# Patient Record
Sex: MALE | Race: WHITE | NOT HISPANIC OR LATINO | Employment: OTHER | URBAN - METROPOLITAN AREA
[De-identification: names, ages, dates, MRNs, and addresses within clinical notes are randomized per-mention and may not be internally consistent; named-entity substitution may affect disease eponyms.]

---

## 2021-06-10 ENCOUNTER — OFFICE VISIT (OUTPATIENT)
Dept: INTERNAL MEDICINE CLINIC | Facility: CLINIC | Age: 61
End: 2021-06-10
Payer: COMMERCIAL

## 2021-06-10 VITALS
HEART RATE: 85 BPM | WEIGHT: 226 LBS | SYSTOLIC BLOOD PRESSURE: 124 MMHG | DIASTOLIC BLOOD PRESSURE: 80 MMHG | BODY MASS INDEX: 29.95 KG/M2 | TEMPERATURE: 98.1 F | OXYGEN SATURATION: 97 % | HEIGHT: 73 IN

## 2021-06-10 DIAGNOSIS — E11.42 TYPE 2 DIABETES MELLITUS WITH DIABETIC POLYNEUROPATHY, WITHOUT LONG-TERM CURRENT USE OF INSULIN (HCC): Primary | ICD-10-CM

## 2021-06-10 DIAGNOSIS — Z13.6 ENCOUNTER FOR SCREENING FOR CARDIOVASCULAR DISORDERS: ICD-10-CM

## 2021-06-10 DIAGNOSIS — M51.35 DDD (DEGENERATIVE DISC DISEASE), THORACOLUMBAR: ICD-10-CM

## 2021-06-10 DIAGNOSIS — R06.00 DOE (DYSPNEA ON EXERTION): ICD-10-CM

## 2021-06-10 DIAGNOSIS — Z80.42 FAMILY HISTORY OF PROSTATE CANCER IN FATHER: ICD-10-CM

## 2021-06-10 DIAGNOSIS — G89.29 OTHER CHRONIC PAIN: ICD-10-CM

## 2021-06-10 DIAGNOSIS — Z12.11 ENCOUNTER FOR SCREENING FOR MALIGNANT NEOPLASM OF COLON: ICD-10-CM

## 2021-06-10 DIAGNOSIS — I73.9 CLAUDICATION OF BOTH LOWER EXTREMITIES (HCC): ICD-10-CM

## 2021-06-10 DIAGNOSIS — E55.9 VITAMIN D DEFICIENCY: ICD-10-CM

## 2021-06-10 DIAGNOSIS — E78.5 HYPERLIPIDEMIA, UNSPECIFIED HYPERLIPIDEMIA TYPE: ICD-10-CM

## 2021-06-10 DIAGNOSIS — Z00.00 MEDICARE ANNUAL WELLNESS VISIT, SUBSEQUENT: ICD-10-CM

## 2021-06-10 PROCEDURE — 3725F SCREEN DEPRESSION PERFORMED: CPT | Performed by: INTERNAL MEDICINE

## 2021-06-10 PROCEDURE — 1036F TOBACCO NON-USER: CPT | Performed by: INTERNAL MEDICINE

## 2021-06-10 PROCEDURE — 99214 OFFICE O/P EST MOD 30 MIN: CPT | Performed by: INTERNAL MEDICINE

## 2021-06-10 PROCEDURE — 3008F BODY MASS INDEX DOCD: CPT | Performed by: INTERNAL MEDICINE

## 2021-06-10 RX ORDER — OXYCODONE HYDROCHLORIDE 15 MG/1
TABLET ORAL
COMMUNITY
Start: 2021-05-12

## 2021-06-10 RX ORDER — OMEPRAZOLE 20 MG/1
CAPSULE, DELAYED RELEASE ORAL DAILY
COMMUNITY

## 2021-06-10 RX ORDER — ALPRAZOLAM 3 MG/1
3 TABLET, EXTENDED RELEASE ORAL
COMMUNITY
End: 2022-02-10

## 2021-06-10 NOTE — PROGRESS NOTES
Assessment/Plan:    Patient is here to establish care and has been lost to f/u for past 5 years  PMH for uncontrolled type 2 DM; a 1 c 10 4  start metformin 500 mg BID and take with meals  Retest a 1c in 3 months  Check micro albumin cr ratio  Need to see Optho for retinal exam  Start checking fasting blood sugar  Obtain lipid panel  H/o chronic pain r/t DDD of thoracolumbar spine including cervical spine; h/o fall off a ladder  He follows with neurology and is on oxycodone  H/o opioid use for over > 30 years  Also on xanax by neurology  C/o intermittent claudication to b/l LE at rest and FELDMAN; check DAMON with exercise study  Stress test ordered  Call Dr Lynda Hidalgo for [facility] screening  Labs ordered today including cbc, cmp, lipid, and vitamin d  F/u in 3-4 months  BMI Counseling: Body mass index is 29 82 kg/m²  The BMI is above normal  Nutrition recommendations include decreasing portion sizes, encouraging healthy choices of fruits and vegetables, decreasing fast food intake and consuming healthier snacks  Exercise recommendations include moderate physical activity 150 minutes/week  Depression Screening and Follow-up Plan: Patient's depression screening was positive with a PHQ-2 score of 0  Clincally patient does not have depression  No treatment is required  No problem-specific Assessment & Plan notes found for this encounter  Diagnoses and all orders for this visit:    Type 2 diabetes mellitus with diabetic polyneuropathy, without long-term current use of insulin (HCC)  -     CBC and differential; Future  -     Comprehensive metabolic panel; Future  -     metFORMIN (GLUCOPHAGE) 500 mg tablet;  Take 1 tablet (500 mg total) by mouth 2 (two) times a day with meals  -     Microalbumin / creatinine urine ratio    Hyperlipidemia, unspecified hyperlipidemia type    DDD (degenerative disc disease), thoracolumbar    Other chronic pain    Encounter for screening for cardiovascular disorders  - Lipid panel; Future    Vitamin D deficiency  -     Vitamin D 25 hydroxy; Future    Claudication of both lower extremities (HCC)  -     VAS DAMON with exercise study; Future    FELDMAN (dyspnea on exertion)  -     Stress test only, exercise; Future    Encounter for screening for malignant neoplasm of colon  -     Ambulatory referral to Gastroenterology; Future    Family history of prostate cancer in father  -     PSA, total and free; Future    Other orders  -     oxyCODONE (ROXICODONE) 15 mg immediate release tablet; take 1 tablet by mouth every 6 hours if needed for CHRONIC NECK PAIN  -     ALPRAZolam ER (XANAX XR) 3 MG 24 hr tablet; Take 3 mg by mouth  -     omeprazole (PriLOSEC) 20 mg delayed release capsule; Take by mouth daily  -     Calcium-Vitamin D-Vitamin K (CALCIUM + D + K PO); Take 2,400 mg by mouth  -     VITAMIN D PO; Take by mouth  -     Boswellia-Glucosamine-Vit D (OSTEO BI-FLEX ONE PER DAY PO); Take by mouth          Subjective:      Patient ID: Charmaine Peng is a 61 y o  male  Patient is here to establish care and has been lost to f/u for past 5 years  PMH for uncontrolled type 2 DM and H/o chronic pain r/t DDD of thoracolumbar spine including cervical spine; h/o fall off a ladder  Has been on opioids for many years  Taking stool softener for constipation  The following portions of the patient's history were reviewed and updated as appropriate:   He  has a past medical history of Diabetes ([address] ), Hyperlipidemia, and Scoliosis  He There are no active problems to display for this patient  He  has no past surgical history on file  His family history includes Diabetes in his father; Heart disease in his father; Hyperlipidemia in his mother; Hypertension in his mother; Prostate cancer in his father; Stroke in his father  He  reports that he has never smoked  He has never used smokeless tobacco  He reports current alcohol use  He reports that he does not use drugs    Current Outpatient Medications   Medication Sig Dispense Refill    ALPRAZolam ER (XANAX XR) 3 MG 24 hr tablet Take 3 mg by mouth      Boswellia-Glucosamine-Vit D (OSTEO BI-FLEX ONE PER DAY PO) Take by mouth      Calcium-Vitamin D-Vitamin K (CALCIUM + D + K PO) Take 2,400 mg by mouth      omeprazole (PriLOSEC) 20 mg delayed release capsule Take by mouth daily      oxyCODONE (ROXICODONE) 15 mg immediate release tablet take 1 tablet by mouth every 6 hours if needed for CHRONIC NECK PAIN      VITAMIN D PO Take by mouth      metFORMIN (GLUCOPHAGE) 500 mg tablet Take 1 tablet (500 mg total) by mouth 2 (two) times a day with meals 60 tablet 5     No current facility-administered medications for this visit  Current Outpatient Medications on File Prior to Visit   Medication Sig    ALPRAZolam ER (XANAX XR) 3 MG 24 hr tablet Take 3 mg by mouth    Boswellia-Glucosamine-Vit D (OSTEO BI-FLEX ONE PER DAY PO) Take by mouth    Calcium-Vitamin D-Vitamin K (CALCIUM + D + K PO) Take 2,400 mg by mouth    omeprazole (PriLOSEC) 20 mg delayed release capsule Take by mouth daily    oxyCODONE (ROXICODONE) 15 mg immediate release tablet take 1 tablet by mouth every 6 hours if needed for CHRONIC NECK PAIN    VITAMIN D PO Take by mouth     No current facility-administered medications on file prior to visit  He is allergic to penicillins and prednisone       Review of Systems   Constitutional: Negative for appetite change, chills, fatigue and unexpected weight change  HENT: Negative for congestion and dental problem  Respiratory: Negative for cough, shortness of breath and wheezing  Cardiovascular: Negative for chest pain, palpitations and leg swelling  Gastrointestinal: Negative for abdominal pain, blood in stool, diarrhea, nausea and vomiting  Endocrine: Positive for polyphagia and polyuria  Musculoskeletal: Positive for arthralgias, back pain, neck pain and neck stiffness  Neurological: Positive for numbness   Negative for dizziness, tremors, seizures, speech difficulty, weakness, light-headedness and headaches  All other systems reviewed and are negative  Objective:      /80 (BP Location: Left arm, Patient Position: Sitting, Cuff Size: Adult)   Pulse 85   Temp 98 1 °F (36 7 °C) (Temporal)   Ht 6' 1" (1 854 m)   Wt 103 kg (226 lb)   SpO2 97%   BMI 29 82 kg/m²          Physical Exam  Vitals signs and nursing note reviewed  Constitutional:       Appearance: Normal appearance  He is overweight  HENT:      Head: Normocephalic and atraumatic  Mouth/Throat:      Mouth: Mucous membranes are moist    Neck:      Musculoskeletal: Normal range of motion  Vascular: No carotid bruit  Cardiovascular:      Rate and Rhythm: Normal rate and regular rhythm  Heart sounds: Normal heart sounds  No murmur  Pulmonary:      Effort: Pulmonary effort is normal       Breath sounds: Normal breath sounds  No wheezing, rhonchi or rales  Abdominal:      General: Bowel sounds are normal       Palpations: Abdomen is soft  Musculoskeletal: Normal range of motion  Right lower leg: No edema  Lymphadenopathy:      Cervical: No cervical adenopathy  Neurological:      Mental Status: He is alert

## 2021-10-12 ENCOUNTER — OFFICE VISIT (OUTPATIENT)
Dept: INTERNAL MEDICINE CLINIC | Facility: CLINIC | Age: 61
End: 2021-10-12
Payer: COMMERCIAL

## 2021-10-12 VITALS
BODY MASS INDEX: 30.09 KG/M2 | TEMPERATURE: 98.5 F | WEIGHT: 227 LBS | OXYGEN SATURATION: 96 % | SYSTOLIC BLOOD PRESSURE: 130 MMHG | HEART RATE: 95 BPM | RESPIRATION RATE: 20 BRPM | DIASTOLIC BLOOD PRESSURE: 88 MMHG | HEIGHT: 73 IN

## 2021-10-12 DIAGNOSIS — Z80.42 FAMILY HISTORY OF PROSTATE CANCER IN FATHER: ICD-10-CM

## 2021-10-12 DIAGNOSIS — Z87.81 HISTORY OF BROKEN NOSE: ICD-10-CM

## 2021-10-12 DIAGNOSIS — Z11.4 ENCOUNTER FOR SCREENING FOR HIV: ICD-10-CM

## 2021-10-12 DIAGNOSIS — Z23 NEED FOR PNEUMOCOCCAL VACCINATION: ICD-10-CM

## 2021-10-12 DIAGNOSIS — E11.42 TYPE 2 DIABETES MELLITUS WITH DIABETIC POLYNEUROPATHY, WITHOUT LONG-TERM CURRENT USE OF INSULIN (HCC): Primary | ICD-10-CM

## 2021-10-12 DIAGNOSIS — E78.5 HYPERLIPIDEMIA, UNSPECIFIED HYPERLIPIDEMIA TYPE: ICD-10-CM

## 2021-10-12 DIAGNOSIS — M51.35 DDD (DEGENERATIVE DISC DISEASE), THORACOLUMBAR: ICD-10-CM

## 2021-10-12 DIAGNOSIS — K22.0 ACHALASIA OF ESOPHAGUS: ICD-10-CM

## 2021-10-12 DIAGNOSIS — Z11.59 ENCOUNTER FOR HEPATITIS C SCREENING TEST FOR LOW RISK PATIENT: ICD-10-CM

## 2021-10-12 DIAGNOSIS — R04.2 BLOODY SPUTUM: ICD-10-CM

## 2021-10-12 DIAGNOSIS — Z12.11 ENCOUNTER FOR SCREENING FOR MALIGNANT NEOPLASM OF COLON: ICD-10-CM

## 2021-10-12 LAB — SL AMB POCT HEMOGLOBIN AIC: 9.8 (ref ?–6.5)

## 2021-10-12 PROCEDURE — 3046F HEMOGLOBIN A1C LEVEL >9.0%: CPT | Performed by: INTERNAL MEDICINE

## 2021-10-12 PROCEDURE — 83036 HEMOGLOBIN GLYCOSYLATED A1C: CPT | Performed by: INTERNAL MEDICINE

## 2021-10-12 PROCEDURE — 1036F TOBACCO NON-USER: CPT | Performed by: INTERNAL MEDICINE

## 2021-10-12 PROCEDURE — 3008F BODY MASS INDEX DOCD: CPT | Performed by: INTERNAL MEDICINE

## 2021-10-12 PROCEDURE — 90471 IMMUNIZATION ADMIN: CPT | Performed by: INTERNAL MEDICINE

## 2021-10-12 PROCEDURE — 90732 PPSV23 VACC 2 YRS+ SUBQ/IM: CPT | Performed by: INTERNAL MEDICINE

## 2021-10-12 PROCEDURE — 99214 OFFICE O/P EST MOD 30 MIN: CPT | Performed by: INTERNAL MEDICINE

## 2021-10-12 RX ORDER — PIOGLITAZONEHYDROCHLORIDE 15 MG/1
15 TABLET ORAL DAILY
Qty: 90 TABLET | Refills: 5 | Status: SHIPPED | OUTPATIENT
Start: 2021-10-12 | End: 2022-07-29 | Stop reason: ALTCHOICE

## 2021-10-20 ENCOUNTER — TELEPHONE (OUTPATIENT)
Dept: INTERNAL MEDICINE CLINIC | Facility: CLINIC | Age: 61
End: 2021-10-20

## 2021-11-09 ENCOUNTER — TELEPHONE (OUTPATIENT)
Dept: INTERNAL MEDICINE CLINIC | Facility: CLINIC | Age: 61
End: 2021-11-09

## 2021-11-22 ENCOUNTER — TELEPHONE (OUTPATIENT)
Dept: GASTROENTEROLOGY | Facility: CLINIC | Age: 61
End: 2021-11-22

## 2021-11-22 ENCOUNTER — TELEPHONE (OUTPATIENT)
Dept: PULMONOLOGY | Facility: CLINIC | Age: 61
End: 2021-11-22

## 2021-11-30 ENCOUNTER — CONSULT (OUTPATIENT)
Dept: PULMONOLOGY | Facility: CLINIC | Age: 61
End: 2021-11-30
Payer: COMMERCIAL

## 2021-11-30 VITALS
BODY MASS INDEX: 29.42 KG/M2 | DIASTOLIC BLOOD PRESSURE: 84 MMHG | HEIGHT: 73 IN | WEIGHT: 222 LBS | OXYGEN SATURATION: 97 % | HEART RATE: 85 BPM | SYSTOLIC BLOOD PRESSURE: 124 MMHG | TEMPERATURE: 97.9 F

## 2021-11-30 DIAGNOSIS — R05.3 CHRONIC COUGH: Primary | ICD-10-CM

## 2021-11-30 DIAGNOSIS — R04.2 BLOODY SPUTUM: ICD-10-CM

## 2021-11-30 DIAGNOSIS — R09.82 POSTNASAL DRIP: ICD-10-CM

## 2021-11-30 PROCEDURE — 3008F BODY MASS INDEX DOCD: CPT | Performed by: INTERNAL MEDICINE

## 2021-11-30 PROCEDURE — 99204 OFFICE O/P NEW MOD 45 MIN: CPT | Performed by: INTERNAL MEDICINE

## 2022-01-05 RX ORDER — ALPRAZOLAM 1 MG/1
TABLET ORAL
COMMUNITY
Start: 2021-11-08

## 2022-01-06 ENCOUNTER — OFFICE VISIT (OUTPATIENT)
Dept: GASTROENTEROLOGY | Facility: CLINIC | Age: 62
End: 2022-01-06
Payer: COMMERCIAL

## 2022-01-06 VITALS
HEART RATE: 85 BPM | WEIGHT: 222 LBS | BODY MASS INDEX: 29.42 KG/M2 | SYSTOLIC BLOOD PRESSURE: 130 MMHG | DIASTOLIC BLOOD PRESSURE: 84 MMHG | HEIGHT: 73 IN

## 2022-01-06 DIAGNOSIS — R13.19 ESOPHAGEAL DYSPHAGIA: ICD-10-CM

## 2022-01-06 DIAGNOSIS — Z86.010 HISTORY OF COLON POLYPS: Primary | ICD-10-CM

## 2022-01-06 PROCEDURE — 3079F DIAST BP 80-89 MM HG: CPT | Performed by: INTERNAL MEDICINE

## 2022-01-06 PROCEDURE — 1036F TOBACCO NON-USER: CPT | Performed by: INTERNAL MEDICINE

## 2022-01-06 PROCEDURE — 99244 OFF/OP CNSLTJ NEW/EST MOD 40: CPT | Performed by: INTERNAL MEDICINE

## 2022-01-06 PROCEDURE — 3008F BODY MASS INDEX DOCD: CPT | Performed by: INTERNAL MEDICINE

## 2022-01-06 PROCEDURE — 3075F SYST BP GE 130 - 139MM HG: CPT | Performed by: INTERNAL MEDICINE

## 2022-01-06 RX ORDER — OXYCODONE HYDROCHLORIDE 5 MG/1
5 TABLET ORAL EVERY 4 HOURS PRN
COMMUNITY
Start: 2021-12-08

## 2022-01-06 NOTE — PATIENT INSTRUCTIONS
Scheduled date of EGD/colonoscopy (as of today):2/10/22  Physician performing EGD/colonoscopy:Braxton  Location of EGD/colonoscopy:[facility] bowel prep reviewed with patient:miralax/dulcolax  Instructions reviewed with patient by:Leslie CANALES  Clearances:  none

## 2022-01-06 NOTE — PROGRESS NOTES
[facility] - Outpatient Consultation  Ioana Waldron 64 y o  male MRN: [id number]  Encounter: [id number]          ASSESSMENT AND PLAN:      1  History of colon polyps  - Colonoscopy; Future    2  Esophageal dysphagia  - EGD; Future    ______________________________________________________________________    HPI:  This 51-year-old male comes the office today with a primary complaint of coughing up blood  When I asked him why he was seeing a gastroenterologist for coughing up blood, his comment was that the episodes always occur in association with eating  He states that the food feels like it is getting stuck in his throat area or in his esophagus and then he will start to cough and eventually the obstruction will come up and sometimes there is blood with it  He has not been seen by ear nose throat specialist at this time  He does have occasional episodes of abdominal pain in the epigastrium  He states that when the food gets stuck sometimes drinking water helps to pass the food as well  He denies any current heartburn symptoms because he is taking omeprazole 20 mg daily  He does have problems with constipation but is also on oxycodone on a regular basis  He denies any diarrhea  There is no family history for colon cancer or for colon polyp  There is no family history for esophageal cancer  His last colonoscopy was 7 years ago and he does have a history of colon polyps  REVIEW OF SYSTEMS:    CONSTITUTIONAL: Denies any fever, chills, rigors, and weight loss  HEENT: No earache or tinnitus  Denies hearing loss or visual disturbances  CARDIOVASCULAR: No chest pain or palpitations  RESPIRATORY: Denies any cough, hemoptysis, shortness of breath or dyspnea on exertion  GASTROINTESTINAL: As noted in the History of Present Illness  GENITOURINARY: No problems with urination  Denies any hematuria or dysuria  NEUROLOGIC: No dizziness or vertigo, denies headaches  MUSCULOSKELETAL: Denies any muscle or joint pain  SKIN: Denies skin rashes or itching  ENDOCRINE: Denies excessive thirst  Denies intolerance to heat or cold  PSYCHOSOCIAL: Denies depression or anxiety  Denies any recent memory loss  Historical Information   Past Medical History:   Diagnosis Date    Cough     Diabetes ([address] )     Hyperlipidemia     Scoliosis      Past Surgical History:   Procedure Laterality Date    TOOTH EXTRACTION  06/2021     Social History   Social History     Substance and Sexual Activity   Alcohol Use Yes    Comment: rarely     Social History     Substance and Sexual Activity   Drug Use Never     Social History     Tobacco Use   Smoking Status Never Smoker   Smokeless Tobacco Never Used     Family History   Problem Relation Age of Onset    Hypertension Mother     Hyperlipidemia Mother     Prostate cancer Father     Heart disease Father     Stroke Father     Diabetes Father        Meds/Allergies       Current Outpatient Medications:     ALPRAZolam (XANAX) 1 mg tablet    Boswellia-Glucosamine-Vit D (OSTEO BI-FLEX ONE PER DAY PO)    Calcium-Vitamin D-Vitamin K (CALCIUM + D + K PO)    metFORMIN (GLUCOPHAGE) 500 mg tablet    omeprazole (PriLOSEC) 20 mg delayed release capsule    oxyCODONE (ROXICODONE) 15 mg immediate release tablet    oxyCODONE (ROXICODONE) 5 immediate release tablet    pioglitazone (ACTOS) 15 mg tablet    VITAMIN D PO    ALPRAZolam ER (XANAX XR) 3 MG 24 hr tablet    Allergies   Allergen Reactions    Penicillins Hives     rash and hives      Prednisone Dizziness     myalgia             Objective     Blood pressure 130/84, pulse 85, height 6' 1" (1 854 m), weight 101 kg (222 lb)  Body mass index is 29 29 kg/m²          PHYSICAL EXAM:      General Appearance:   Alert, cooperative, no distress   HEENT:   Normocephalic, atraumatic, anicteric      Neck:  Supple, symmetrical, trachea midline   Lungs:   Clear to auscultation bilaterally; no rales, rhonchi or wheezing; respirations unlabored    Heart[de-identified]   Regular rate and rhythm; no murmur, rub, or gallop  Abdomen:   Soft, non-tender, non-distended; normal bowel sounds; no masses, no organomegaly    Genitalia:   Deferred    Rectal:   Deferred    Extremities:  No cyanosis, clubbing or edema    Pulses:  2+ and symmetric    Skin:  No jaundice, rashes, or lesions    Lymph nodes:  No palpable cervical lymphadenopathy        Lab Results:   No visits with results within 1 Day(s) from this visit  Latest known visit with results is:   Office Visit on 10/12/2021   Component Date Value    Hemoglobin A1C 10/12/2021 9 8*         Radiology Results:   No results found  Answers for HPI/ROS submitted by the patient on 1/5/2022  arthralgias: Yes  belching: Yes  constipation: Yes  diarrhea: No  dysuria: No  fever: No  flatus: No  frequency: No  headaches: No  hematochezia: No  hematuria: No  melena: No  myalgias: Yes  nausea:  No  weight loss: No  vomiting: No  Diagnostic workup: CT scan

## 2022-02-09 ENCOUNTER — TELEPHONE (OUTPATIENT)
Dept: GASTROENTEROLOGY | Facility: HOSPITAL | Age: 62
End: 2022-02-09

## 2022-02-10 ENCOUNTER — HOSPITAL ENCOUNTER (OUTPATIENT)
Dept: GASTROENTEROLOGY | Facility: HOSPITAL | Age: 62
Setting detail: OUTPATIENT SURGERY
Discharge: HOME | End: 2022-02-10
Attending: INTERNAL MEDICINE | Admitting: INTERNAL MEDICINE
Payer: COMMERCIAL

## 2022-02-10 ENCOUNTER — ANESTHESIA EVENT (OUTPATIENT)
Dept: GASTROENTEROLOGY | Facility: HOSPITAL | Age: 62
End: 2022-02-10

## 2022-02-10 ENCOUNTER — ANESTHESIA (OUTPATIENT)
Dept: GASTROENTEROLOGY | Facility: HOSPITAL | Age: 62
End: 2022-02-10

## 2022-02-10 VITALS
BODY MASS INDEX: 27.76 KG/M2 | OXYGEN SATURATION: 95 % | RESPIRATION RATE: 20 BRPM | TEMPERATURE: 97.7 F | HEART RATE: 77 BPM | HEIGHT: 73 IN | DIASTOLIC BLOOD PRESSURE: 86 MMHG | SYSTOLIC BLOOD PRESSURE: 123 MMHG | WEIGHT: 209.44 LBS

## 2022-02-10 DIAGNOSIS — Z86.010 HISTORY OF COLON POLYPS: ICD-10-CM

## 2022-02-10 DIAGNOSIS — R13.19 ESOPHAGEAL DYSPHAGIA: ICD-10-CM

## 2022-02-10 LAB — GLUCOSE SERPL-MCNC: 210 MG/DL (ref 65–140)

## 2022-02-10 PROCEDURE — 82948 REAGENT STRIP/BLOOD GLUCOSE: CPT

## 2022-02-10 PROCEDURE — 88305 TISSUE EXAM BY PATHOLOGIST: CPT | Performed by: PATHOLOGY

## 2022-02-10 PROCEDURE — 43248 EGD GUIDE WIRE INSERTION: CPT | Performed by: INTERNAL MEDICINE

## 2022-02-10 PROCEDURE — 43239 EGD BIOPSY SINGLE/MULTIPLE: CPT | Performed by: INTERNAL MEDICINE

## 2022-02-10 PROCEDURE — 45385 COLONOSCOPY W/LESION REMOVAL: CPT | Performed by: INTERNAL MEDICINE

## 2022-02-10 RX ORDER — PROPOFOL 10 MG/ML
INJECTION, EMULSION INTRAVENOUS AS NEEDED
Status: DISCONTINUED | OUTPATIENT
Start: 2022-02-10 | End: 2022-02-10

## 2022-02-10 RX ORDER — LIDOCAINE HYDROCHLORIDE 20 MG/ML
INJECTION, SOLUTION EPIDURAL; INFILTRATION; INTRACAUDAL; PERINEURAL AS NEEDED
Status: DISCONTINUED | OUTPATIENT
Start: 2022-02-10 | End: 2022-02-10

## 2022-02-10 RX ORDER — SODIUM CHLORIDE, SODIUM LACTATE, POTASSIUM CHLORIDE, CALCIUM CHLORIDE 600; 310; 30; 20 MG/100ML; MG/100ML; MG/100ML; MG/100ML
125 INJECTION, SOLUTION INTRAVENOUS CONTINUOUS
Status: DISCONTINUED | OUTPATIENT
Start: 2022-02-10 | End: 2022-02-14 | Stop reason: HOSPADM

## 2022-02-10 RX ORDER — SODIUM CHLORIDE, SODIUM LACTATE, POTASSIUM CHLORIDE, CALCIUM CHLORIDE 600; 310; 30; 20 MG/100ML; MG/100ML; MG/100ML; MG/100ML
INJECTION, SOLUTION INTRAVENOUS CONTINUOUS PRN
Status: DISCONTINUED | OUTPATIENT
Start: 2022-02-10 | End: 2022-02-10

## 2022-02-10 RX ADMIN — LIDOCAINE HYDROCHLORIDE 5 ML: 20 INJECTION, SOLUTION EPIDURAL; INFILTRATION; INTRACAUDAL; PERINEURAL at 07:18

## 2022-02-10 RX ADMIN — SODIUM CHLORIDE, SODIUM LACTATE, POTASSIUM CHLORIDE, AND CALCIUM CHLORIDE: .6; .31; .03; .02 INJECTION, SOLUTION INTRAVENOUS at 07:14

## 2022-02-10 RX ADMIN — PROPOFOL 150 MG: 10 INJECTION, EMULSION INTRAVENOUS at 07:20

## 2022-02-10 RX ADMIN — PROPOFOL 20 MG: 10 INJECTION, EMULSION INTRAVENOUS at 07:23

## 2022-02-10 RX ADMIN — PROPOFOL 50 MG: 10 INJECTION, EMULSION INTRAVENOUS at 07:31

## 2022-02-10 RX ADMIN — SODIUM CHLORIDE, SODIUM LACTATE, POTASSIUM CHLORIDE, AND CALCIUM CHLORIDE 125 ML/HR: .6; .31; .03; .02 INJECTION, SOLUTION INTRAVENOUS at 07:10

## 2022-02-10 RX ADMIN — PROPOFOL 10 MG: 10 INJECTION, EMULSION INTRAVENOUS at 07:34

## 2022-02-10 RX ADMIN — PROPOFOL 10 MG: 10 INJECTION, EMULSION INTRAVENOUS at 07:41

## 2022-02-10 RX ADMIN — PROPOFOL 10 MG: 10 INJECTION, EMULSION INTRAVENOUS at 07:38

## 2022-02-10 NOTE — ANESTHESIA POSTPROCEDURE EVALUATION
Post-Op Assessment Note    CV Status:  Stable    Pain management: adequate     Mental Status:  Alert and awake   Hydration Status:  Euvolemic   PONV Controlled:  Controlled   Airway Patency:  Patent      Post Op Vitals Reviewed: Yes      Staff: CRNA         No complications documented      BP   104/67   Temp 97 9   Pulse 70   Resp   20   SpO2   98

## 2022-02-10 NOTE — ANESTHESIA PREPROCEDURE EVALUATION
Procedure:  COLONOSCOPY  EGD    Relevant Problems   No relevant active problems        Physical Exam    Airway    Mallampati score: II  TM Distance: >3 FB  Neck ROM: limited     Dental   No notable dental hx     Cardiovascular  Rhythm: regular, Rate: normal, No murmur, Cardiovascular exam normal    Pulmonary  Pulmonary exam normal Breath sounds clear to auscultation, No wheezes,     Other Findings        Anesthesia Plan  ASA Score- 2     Anesthesia Type- IV sedation with anesthesia with ASA Monitors  Additional Monitors:   Airway Plan:           Plan Factors-Exercise tolerance (METS): >4 METS  Chart reviewed  Existing labs reviewed  Patient is not a current smoker  Patient instructed to abstain from smoking on day of procedure  Patient did not smoke on day of surgery  There is medical exclusion for perioperative obstructive sleep apnea risk education  Induction- intravenous  Postoperative Plan-     Informed Consent- Anesthetic plan and risks discussed with patient  I personally reviewed this patient with the CRNA  Discussed and agreed on the Anesthesia Plan with the CRNA  Alfa Razo Pt discharged as per provider. Teaching done by ALETA Riddle. Pt verbalizes understanding to discharge orders & will follow up with PMD post discharge. IV lock removed. No bleeding noted. Pt stable upon discharge.

## 2022-02-10 NOTE — H&P
History and Physical -  [facility]  Juan Jenkins 64 y o  male MRN: [id number]      HPI: Juan Jenkins is a 64y o  year old male who presents for evaluation of dysphagia and history of colon polyps      REVIEW OF SYSTEMS: Per the HPI, and otherwise unremarkable  Historical Information   Past Medical History:   Diagnosis Date    Cough     Diabetes ([address] )     Hyperlipidemia     Scoliosis      Past Surgical History:   Procedure Laterality Date    TOOTH EXTRACTION  06/2021     Social History   Social History     Substance and Sexual Activity   Alcohol Use Yes    Comment: rarely     Social History     Substance and Sexual Activity   Drug Use Never     Social History     Tobacco Use   Smoking Status Never Smoker   Smokeless Tobacco Never Used     Family History   Problem Relation Age of Onset    Hypertension Mother     Hyperlipidemia Mother     Prostate cancer Father     Heart disease Father     Stroke Father     Diabetes Father        Meds/Allergies     (Not in a hospital admission)      Allergies   Allergen Reactions    Penicillins Hives     rash and hives      Prednisone Dizziness     myalgia         Objective     Blood pressure 127/88, pulse 88, temperature 97 8 °F (36 6 °C), temperature source Temporal, resp  rate 16, height 6' 1" (1 854 m), weight 95 kg (209 lb 7 oz), SpO2 97 %  PHYSICAL EXAM    Gen: NAD  CV: RRR  CHEST: Clear  ABD: soft, NT/ND  EXT: no edema      ASSESSMENT/PLAN:  This is a 64y o  year old male here for EGD with dilation possible biopsy, colonoscopy, and he is stable and optimized for his procedure

## 2022-02-15 ENCOUNTER — TELEPHONE (OUTPATIENT)
Dept: GASTROENTEROLOGY | Facility: CLINIC | Age: 62
End: 2022-02-15

## 2022-02-15 NOTE — TELEPHONE ENCOUNTER
----- Message from Angela Dean DO sent at 2/14/2022  5:05 PM EST -----  Please call the patient with the polyp results  The polyps of the colon were precancerous polyps and completely removed  His next colonoscopy is due in 3 years  Biopsies of the distal esophagus were benign and negative for cancer  The biopsy of the proximal esophagus was benign and negative for eosinophilic esophagitis well as cancer

## 2022-02-22 ENCOUNTER — OFFICE VISIT (OUTPATIENT)
Dept: PULMONOLOGY | Facility: CLINIC | Age: 62
End: 2022-02-22
Payer: COMMERCIAL

## 2022-02-22 VITALS
OXYGEN SATURATION: 97 % | HEART RATE: 77 BPM | SYSTOLIC BLOOD PRESSURE: 120 MMHG | DIASTOLIC BLOOD PRESSURE: 84 MMHG | WEIGHT: 222 LBS | HEIGHT: 73 IN | BODY MASS INDEX: 29.42 KG/M2 | TEMPERATURE: 96.1 F

## 2022-02-22 DIAGNOSIS — R93.89 ABNORMAL CT OF THE CHEST: ICD-10-CM

## 2022-02-22 DIAGNOSIS — R05.3 CHRONIC COUGH: Primary | ICD-10-CM

## 2022-02-22 DIAGNOSIS — R09.82 POSTNASAL DRIP: ICD-10-CM

## 2022-02-22 PROCEDURE — 3008F BODY MASS INDEX DOCD: CPT | Performed by: INTERNAL MEDICINE

## 2022-02-22 PROCEDURE — 99214 OFFICE O/P EST MOD 30 MIN: CPT | Performed by: INTERNAL MEDICINE

## 2022-02-22 PROCEDURE — 3074F SYST BP LT 130 MM HG: CPT | Performed by: INTERNAL MEDICINE

## 2022-02-22 PROCEDURE — 3079F DIAST BP 80-89 MM HG: CPT | Performed by: INTERNAL MEDICINE

## 2022-02-22 PROCEDURE — 1036F TOBACCO NON-USER: CPT | Performed by: INTERNAL MEDICINE

## 2022-02-22 NOTE — PROGRESS NOTES
Assessment/Plan:   Diagnoses and all orders for this visit:    Chronic cough  -     Complete PFT with post bronchodilator; Future    Postnasal drip    Abnormal CT of the chest  -     CT chest without contrast; Future      chronic cough likely secondary to his postnasal drip and minimal acid reflux and GERD for which he is currently on the omeprazole and the Prilosec States  Following up with ENT for his postnasal drip as well as his sinus issues  Currently does not have any episodes of bloody sputum   He did have a CT of the chest done in November of 2021 with a 2 mm left lower lobe lung nodule given no prior history of smoking no personal history of any cancer no need for follow-up there was a atelectasis  ? /pneumonia of the right middle lobe will follow-up with repeat CT of the chest in 3 months from then for stability  If improved or stable and no worsening from the prior CT no need for any further follow-up  Discussed to bring the disc from the prior CT and give it to sent [employer] for upload  Will also get a complete PFT with post bronchodilator and follow-up   Follow-up after the above testing      Return in about 3 months (around 5/22/2022)  All questions are answered to the patient's satisfaction and understanding  He verbalizes understanding  He is encouraged to call with any further questions or concerns  Portions of the record may have been created with voice recognition software  Occasional wrong word or "sound a like" substitutions may have occurred due to the inherent limitations of voice recognition software  Read the chart carefully and recognize, using context, where substitutions have occurred      Electronically Signed by Janae Lock MD    ______________________________________________________________________    Chief Complaint:   Chief Complaint   Patient presents with    Follow-up       Patient ID: Mahnaz Arteaga is a 64 y o  y o  male has a past medical history of Chronic pain disorder, Colon polyp, Cough, Diabetes ([address] ), Diabetes mellitus ([facility] [address] ), Hyperlipidemia, and Scoliosis  2/22/2022  Patient presents today for follow-up visit  No Rivera is a very pleasant 60-year-old gentleman who has never smoked, states he does have history of gastritis and cannot tolerate Motrin has had recently about 1 and half months ago had the small a tooth pulled out he states, after which he did have lot of pain for which he was given Motrin and tylenol, also he start states that he had a nose bleed about 4 weeks ago, and had a 4-5 episodes of blood in the sputum he states he was still having some cough and he used to bring up small amounts of bright red blood, no history of any chest pains he did not go into the ER at that time he states that the amount small amounts of blood, he is still pretty active stating that 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, he has not had any prior history of PEs or DVTs he states  He was ordered for a CT of the chest by his primary care which she had and he is here for follow-up  He states the last time this episode of blood in the sputum and the cough was about 2 months ago, after which he did not have any episodes  He continues to have the cough and the postnasal drip and he states he has been following up with ENT the sinus issues have slightly decreased from before        Review of Systems   Constitutional: Negative  HENT: Positive for postnasal drip and sinus pressure  Eyes: Negative  Respiratory: Positive for cough  Cardiovascular: Negative  Gastrointestinal: Negative  Endocrine: Negative  Genitourinary: Negative  Musculoskeletal: Negative  Allergic/Immunologic: Negative  Neurological: Negative  Hematological: Negative  Psychiatric/Behavioral: Negative  Smoking history: He reports that he has never smoked   He has never used smokeless tobacco     The following portions of the patient's history were reviewed and updated as appropriate: allergies, current medications, past family history, past medical history, past social history, past surgical history and problem list     Immunization History   Administered Date(s) Administered    COVID-19 MODERNA VACC 0 5 ML IM 01/29/2021, 02/26/2021    H1N1 Inj 11/01/2009    H1N1, All Formulations 11/01/2009, 01/09/2010    INFLUENZA 11/14/2012, 09/17/2014, 12/06/2016, 11/08/2017, 11/09/2018, 09/04/2020, 10/09/2021    Pneumococcal Polysaccharide PPV23 10/12/2021    Td (adult), adsorbed 10/29/2002    Tdap 07/31/2013    Tuberculin Skin Test-PPD Intradermal 05/04/2007     Current Outpatient Medications   Medication Sig Dispense Refill    ALPRAZolam (XANAX) 1 mg tablet take 1 tablet by mouth every morning and 2 tablets by mouth AT NIGHT if needed      [place]-Glucosamine-Vit D (OSTEO BI-FLEX ONE PER DAY PO) Take by mouth      Calcium-Vitamin D-Vitamin K (CALCIUM + D + K PO) Take 2,400 mg by mouth      metFORMIN (GLUCOPHAGE) 500 mg tablet Take 1 tablet (500 mg total) by mouth 2 (two) times a day with meals 60 tablet 5    omeprazole (PriLOSEC) 20 mg delayed release capsule Take by mouth daily      oxyCODONE (ROXICODONE) 15 mg immediate release tablet take 1 tablet by mouth every 6 hours if needed for CHRONIC NECK PAIN      oxyCODONE (ROXICODONE) 5 immediate release tablet Take 5 mg by mouth every 4 (four) hours as needed      VITAMIN D PO Take by mouth      pioglitazone (ACTOS) 15 mg tablet Take 1 tablet (15 mg total) by mouth daily 90 tablet 5     No current facility-administered medications for this visit  Allergies: Penicillins and Prednisone    Objective:  Vitals:    02/22/22 1129   BP: 120/84   Pulse: 77   Temp: (!) 96 1 °F (35 6 °C)   SpO2: 97%   Weight: 101 kg (222 lb)   Height: 6' 1" (1 854 m)   Oxygen Therapy  SpO2: 97 %      Wt Readings from Last 3 Encounters:   02/22/22 101 kg (222 lb)   02/10/22 95 kg (209 lb 7 oz)   01/06/22 101 kg (222 lb) Body mass index is 29 29 kg/m²  Physical Exam  Vitals and nursing note reviewed  Constitutional:       Appearance: He is well-developed  HENT:      Head: Normocephalic and atraumatic  Eyes:      Conjunctiva/sclera: Conjunctivae normal       Pupils: Pupils are equal, round, and reactive to light  Neck:      Thyroid: No thyromegaly  Vascular: No JVD  Cardiovascular:      Rate and Rhythm: Normal rate and regular rhythm  Heart sounds: Normal heart sounds  No murmur heard  No friction rub  No gallop  Pulmonary:      Effort: Pulmonary effort is normal  No respiratory distress  Breath sounds: Normal breath sounds  No wheezing or rales  Chest:      Chest wall: No tenderness  Musculoskeletal:         General: No tenderness or deformity  Normal range of motion  Cervical back: Normal range of motion and neck supple  Lymphadenopathy:      Cervical: No cervical adenopathy  Skin:     General: Skin is warm and dry  Neurological:      Mental Status: He is alert and oriented to person, place, and time  Diagnostics:  I have personally reviewed pertinent reports  EGD    Result Date: 2/10/2022  Narrative:  [address] [place] [address] Endoscopy [address] [place] [phone] DATE OF SERVICE: 2/10/22 PHYSICIAN(S): Vicky Perdomo DO Proceduralist INDICATION: Esophageal dysphagia POST-OP DIAGNOSIS: See the impression below  PREPROCEDURE: Informed consent was obtained for the procedure, including sedation  Risks of perforation, hemorrhage, adverse drug reaction and aspiration were discussed  The patient was placed in the left lateral decubitus position  Patient was explained about the risks and benefits of the procedure  Risks including but not limited to bleeding, infection, and perforation were explained in detail  Also explained about less than 100% sensitivity with the exam and other alternatives   DETAILS OF PROCEDURE: Patient was taken to the procedure room where a time out was performed to confirm correct patient and correct procedure  The patient underwent monitored anesthesia care, which was administered by an anesthesia professional  The patient's blood pressure, heart rate, level of consciousness, respirations and oxygen were monitored throughout the procedure  The scope was advanced to the second part of the duodenum  Retroflexion was performed in the fundus  Prior to the procedure, the patient's H  Pylori status was unknown  The patient's estimated blood loss was minimal (<5 mL)  The procedure was not difficult  The patient tolerated the procedure well  There were no apparent complications  ANESTHESIA INFORMATION: ASA: II Anesthesia Type: IV Sedation with Anesthesia MEDICATIONS: No administrations occurring from 0715 to 0728 on 02/10/22 FINDINGS: The cricopharynx, upper third of the esophagus, middle third of the esophagus, lower third of the esophagus, GE junction and Z-line appeared normal  Z-line is 40 cm from the incisors  Performed 4 biopsies to rule out eosinophilic esophagitis in the lower third of the esophagus Performed 4 biopsies to rule out eosinophilic esophagitis in the upper third of the esophagus The cardia, fundus of the stomach, body of the stomach, greater curve of the stomach, lesser curve of the stomach, incisura, antrum and prepyloric region appeared normal  The duodenal bulb and 2nd part of the duodenum appeared normal  Dilated with Savary-Dwight dilator using guidewire to 47 Fr ending size SPECIMENS: ID Type Source Tests Collected by Time Destination 1 : distal esophagus Tissue Esophagus TISSUE EXAM Kandace Betancourt DO 2/10/2022  7:27 AM      Impression: 1  Dysphagia, status post biopsy to rule out eosinophilic esophagitis followed by empiric dilation with a 54 [place] Savary dilator the RECOMMENDATION: 1   Will call the patient 1 week regarding the biopsy results  Kandace Betancourt DO [place], [place] the     Colonoscopy    Result Date: 2/10/2022  Narrative:  [id number] Endoscopy [address] 89 [phone] DATE OF SERVICE: 2/10/22 PHYSICIAN(S): Wicho Swartz DO Proceduralist INDICATION: History of colon polyps Colonoscopy performed for a diagnostic indication  POST-OP DIAGNOSIS: See the impression below  HISTORY: Prior colonoscopy: 5 years ago  BOWEL PREPARATION: Miralax/Dulcolax PREPROCEDURE: Informed consent was obtained for the procedure, including sedation  Risks including but not limited to bleeding, infection, perforation, adverse drug reaction and aspiration were explained in detail  Also explained about less than 100% sensitivity with the exam and other alternatives  The patient was placed in the left lateral decubitus position  Prostate examination was performed and the prostate was normal  DETAILS OF PROCEDURE: Patient was taken to the procedure room where a time out was performed to confirm correct patient and correct procedure  The patient underwent monitored anesthesia care, which was administered by an anesthesia professional  The patient's blood pressure, heart rate, level of consciousness, oxygen and respirations were monitored throughout the procedure  A digital rectal exam was performed  The scope was introduced through the anus and advanced to the cecum  Retroflexion was performed in the rectum  The quality of bowel preparation was evaluated using the [facility] Bowel Preparation Scale with scores of: right colon = 2, transverse colon = 2, left colon = 2  The total BBPS score was 6  Bowel prep was adequate  The patient's estimated blood loss was minimal (<5 mL)  The procedure was not difficult  The patient tolerated the procedure well  There were no apparent complications   ANESTHESIA INFORMATION: ASA: II Anesthesia Type: IV Sedation with Anesthesia MEDICATIONS: No administrations occurring from 0715 to 0745 on 02/10/22 FINDINGS: One 10 mm semi-pedunculated polyp in the descending colon; bleeding occurred after intervention; completely removed en bloc by cold snare and retrieved specimen; placed 1 clip successfully (clip is MRI compatible) The cecum, ascending colon, hepatic flexure, transverse colon, splenic flexure, sigmoid colon, rectosigmoid and rectum appeared normal  EVENTS: Procedure Events Event Event Time ENDO CECUM REACHED 2/10/2022  7:35 AM ENDO SCOPE OUT TIME 2/10/2022  7:45 AM SPECIMENS: ID Type Source Tests Collected by Time Destination 1 : distal esophagus Tissue Esophagus TISSUE EXAM Lemuel Mata,  2/10/2022  7:27 AM  2 : proximal esophagus Tissue Esophagus TISSUE EXAM Lemuel Mata, DO 2/10/2022  7:28 AM  3 : descending  x2 Tissue Polyp, Colorectal TISSUE EXAM Lemuel Mata, DO 2/10/2022  7:42 AM  EQUIPMENT: Colonoscope -[id number]     Impression: 1  Semi pedunculated polyp of the descending colon status post piecemeal cold snare polypectomy and clipping 2  Normal prostate by digital exam RECOMMENDATION: Repeat colonoscopy in 3 years due to a personal history of colon polyps   Will call the patient 1 week regarding the polyp results DO Ted Lagunas Dears

## 2022-03-22 ENCOUNTER — HOSPITAL ENCOUNTER (OUTPATIENT)
Dept: CT IMAGING | Facility: HOSPITAL | Age: 62
Discharge: HOME | End: 2022-03-22
Attending: INTERNAL MEDICINE
Payer: COMMERCIAL

## 2022-03-22 DIAGNOSIS — R93.89 ABNORMAL CT OF THE CHEST: ICD-10-CM

## 2022-03-22 PROCEDURE — 71250 CT THORAX DX C-: CPT

## 2022-03-22 PROCEDURE — G1004 CDSM NDSC: HCPCS

## 2022-05-03 ENCOUNTER — OFFICE VISIT (OUTPATIENT)
Dept: PULMONOLOGY | Facility: CLINIC | Age: 62
End: 2022-05-03
Payer: COMMERCIAL

## 2022-05-03 VITALS
HEART RATE: 87 BPM | SYSTOLIC BLOOD PRESSURE: 120 MMHG | HEIGHT: 73 IN | BODY MASS INDEX: 28.89 KG/M2 | DIASTOLIC BLOOD PRESSURE: 84 MMHG | TEMPERATURE: 97.6 F | WEIGHT: 218 LBS | OXYGEN SATURATION: 95 %

## 2022-05-03 DIAGNOSIS — R09.82 POSTNASAL DRIP: ICD-10-CM

## 2022-05-03 DIAGNOSIS — R91.8 LUNG NODULES: ICD-10-CM

## 2022-05-03 DIAGNOSIS — R05.3 CHRONIC COUGH: Primary | ICD-10-CM

## 2022-05-03 PROCEDURE — 3074F SYST BP LT 130 MM HG: CPT | Performed by: INTERNAL MEDICINE

## 2022-05-03 PROCEDURE — 3079F DIAST BP 80-89 MM HG: CPT | Performed by: INTERNAL MEDICINE

## 2022-05-03 PROCEDURE — 99214 OFFICE O/P EST MOD 30 MIN: CPT | Performed by: INTERNAL MEDICINE

## 2022-05-03 PROCEDURE — 3008F BODY MASS INDEX DOCD: CPT | Performed by: INTERNAL MEDICINE

## 2022-05-03 PROCEDURE — 1036F TOBACCO NON-USER: CPT | Performed by: INTERNAL MEDICINE

## 2022-05-03 NOTE — PROGRESS NOTES
Assessment/Plan:   Diagnoses and all orders for this visit:    Chronic cough    Postnasal drip    Lung nodules  -     CT chest without contrast; Future        Chronic cough likely secondary to his postnasal drip and minimal acid reflux and GERD for which he is currently on the omeprazole and the prior Prilosec   Also following up with ENT for his postnasal drip as well as sinus issues to continue with the nasal sprays  CT of the chest done in November 2021 with a 2 mm left lower lobe lung nodule no prior history of smoking or personal history of any cancer no need for follow-up there was an atelectasis ? Pneumonia of the right middle lobe and will for which a follow-up CT of the chest was ordered   Repeat CT of the chest recently done report and images reviewed with no evidence of any atelectasis or pneumonia in the right lower lobe, subside cm lung nodules noted all stable since the prior CT of the chest   Will repeat CT of the chest in 1 year from now and if the lung nodules are stable he would not need any follow-up imaging after that  Age-appropriate screening process discussed with PSA as well as colonoscopy  PFT still pending  Vaccinations up-to-date   Follow-up in 1 year or p r n  earlier as needed  Return in about 1 year (around 5/3/2023)  All questions are answered to the patient's satisfaction and understanding  He verbalizes understanding  He is encouraged to call with any further questions or concerns  Portions of the record may have been created with voice recognition software  Occasional wrong word or "sound a like" substitutions may have occurred due to the inherent limitations of voice recognition software  Read the chart carefully and recognize, using context, where substitutions have occurred      Electronically Signed by Naty Fleming MD    ______________________________________________________________________    Chief Complaint:   Chief Complaint   Patient presents with   Wally Chapman Follow-up       Patient ID: Yvon Garland is a 64 y o  y o  male has a past medical history of Chronic pain disorder, Colon polyp, Cough, Diabetes ([address] ), Diabetes mellitus ([facility] [address] ), Hyperlipidemia, and Scoliosis  5/3/2022  Patient presents today for follow-up visit  Patient is here for follow-up for his chronic cough he states that the cough has significantly decreased from before he does still continues to have some postnasal drip following up with ENT as well using the nasal sprays he states  The acid reflux and GERD has significantly improved had a CT of the chest done and he is here for follow-up  Review of Systems   Constitutional: Negative  HENT: Positive for postnasal drip  Eyes: Negative  Respiratory: Positive for cough  Cardiovascular: Negative  Gastrointestinal: Negative  Endocrine: Negative  Genitourinary: Negative  Musculoskeletal: Negative  Allergic/Immunologic: Positive for environmental allergies  Neurological: Negative  Hematological: Negative  Psychiatric/Behavioral: Negative  Smoking history: He reports that he has never smoked   He has never used smokeless tobacco     The following portions of the patient's history were reviewed and updated as appropriate: allergies, current medications, past family history, past medical history, past social history, past surgical history and problem list     Immunization History   Administered Date(s) Administered    COVID-19 MODERNA VACC 0 5 ML IM 01/29/2021, 02/26/2021    H1N1 Inj 11/01/2009    H1N1, All Formulations 11/01/2009, 01/09/2010    INFLUENZA 11/14/2012, 09/17/2014, 12/06/2016, 11/08/2017, 11/09/2018, 09/04/2020, 10/09/2021    Pneumococcal Polysaccharide PPV23 10/12/2021    Td (adult), adsorbed 10/29/2002    Tdap 07/31/2013    Tuberculin Skin Test-PPD Intradermal 05/04/2007     Current Outpatient Medications   Medication Sig Dispense Refill    ALPRAZolam (XANAX) 1 mg tablet take 1 tablet by mouth every morning and 2 tablets by mouth AT NIGHT if needed      [place]-Glucosamine-Vit D (OSTEO BI-FLEX ONE PER DAY PO) Take by mouth      Calcium-Vitamin D-Vitamin K (CALCIUM + D + K PO) Take 2,400 mg by mouth      metFORMIN (GLUCOPHAGE) 500 mg tablet Take 1 tablet (500 mg total) by mouth 2 (two) times a day with meals 60 tablet 0    omeprazole (PriLOSEC) 20 mg delayed release capsule Take by mouth daily      oxyCODONE (ROXICODONE) 15 mg immediate release tablet take 1 tablet by mouth every 6 hours if needed for CHRONIC NECK PAIN      oxyCODONE (ROXICODONE) 5 immediate release tablet Take 5 mg by mouth every 4 (four) hours as needed      VITAMIN D PO Take by mouth      pioglitazone (ACTOS) 15 mg tablet Take 1 tablet (15 mg total) by mouth daily 90 tablet 5     No current facility-administered medications for this visit  Allergies: Penicillins and Prednisone    Objective:  Vitals:    05/03/22 1136   BP: 120/84   Pulse: 87   Temp: 97 6 °F (36 4 °C)   SpO2: 95%   Weight: 98 9 kg (218 lb)   Height: 6' 1" (1 854 m)   Oxygen Therapy  SpO2: 95 %    Wt Readings from Last 3 Encounters:   05/03/22 98 9 kg (218 lb)   02/22/22 101 kg (222 lb)   02/10/22 95 kg (209 lb 7 oz)     Body mass index is 28 76 kg/m²  Physical Exam  Vitals and nursing note reviewed  Constitutional:       Appearance: He is well-developed  HENT:      Head: Normocephalic and atraumatic  Eyes:      Conjunctiva/sclera: Conjunctivae normal       Pupils: Pupils are equal, round, and reactive to light  Neck:      Thyroid: No thyromegaly  Vascular: No JVD  Cardiovascular:      Rate and Rhythm: Normal rate and regular rhythm  Heart sounds: Normal heart sounds  No murmur heard  No friction rub  No gallop  Pulmonary:      Effort: Pulmonary effort is normal  No respiratory distress  Breath sounds: Normal breath sounds  No wheezing or rales  Chest:      Chest wall: No tenderness     Musculoskeletal:         General: No tenderness or deformity  Normal range of motion  Cervical back: Normal range of motion and neck supple  Lymphadenopathy:      Cervical: No cervical adenopathy  Skin:     General: Skin is warm and dry  Neurological:      Mental Status: He is alert and oriented to person, place, and time             Diagnostics:  I have personally reviewed pertinent films in PACS

## 2022-05-10 DIAGNOSIS — E11.42 TYPE 2 DIABETES MELLITUS WITH DIABETIC POLYNEUROPATHY, WITHOUT LONG-TERM CURRENT USE OF INSULIN (HCC): ICD-10-CM

## 2022-06-16 DIAGNOSIS — E11.42 TYPE 2 DIABETES MELLITUS WITH DIABETIC POLYNEUROPATHY, WITHOUT LONG-TERM CURRENT USE OF INSULIN (HCC): ICD-10-CM

## 2022-07-29 ENCOUNTER — OFFICE VISIT (OUTPATIENT)
Dept: FAMILY MEDICINE CLINIC | Facility: CLINIC | Age: 62
End: 2022-07-29
Payer: COMMERCIAL

## 2022-07-29 VITALS
HEART RATE: 90 BPM | WEIGHT: 218 LBS | SYSTOLIC BLOOD PRESSURE: 126 MMHG | DIASTOLIC BLOOD PRESSURE: 72 MMHG | BODY MASS INDEX: 28.89 KG/M2 | HEIGHT: 73 IN | OXYGEN SATURATION: 97 % | TEMPERATURE: 97.3 F

## 2022-07-29 DIAGNOSIS — Z00.00 ANNUAL PHYSICAL EXAM: Primary | ICD-10-CM

## 2022-07-29 DIAGNOSIS — F11.20 CONTINUOUS OPIOID DEPENDENCE (HCC): ICD-10-CM

## 2022-07-29 DIAGNOSIS — M50.30 DDD (DEGENERATIVE DISC DISEASE), CERVICAL: ICD-10-CM

## 2022-07-29 DIAGNOSIS — M51.36 DDD (DEGENERATIVE DISC DISEASE), LUMBAR: ICD-10-CM

## 2022-07-29 DIAGNOSIS — E11.42 TYPE 2 DIABETES MELLITUS WITH DIABETIC POLYNEUROPATHY, WITHOUT LONG-TERM CURRENT USE OF INSULIN (HCC): ICD-10-CM

## 2022-07-29 DIAGNOSIS — M51.34 DDD (DEGENERATIVE DISC DISEASE), THORACIC: ICD-10-CM

## 2022-07-29 LAB
CREAT UR-MCNC: 94.9 MG/DL
LEFT EYE DIABETIC RETINOPATHY: NORMAL
LEFT EYE IMAGE QUALITY: NORMAL
LEFT EYE MACULAR EDEMA: NORMAL
LEFT EYE OTHER RETINOPATHY: NORMAL
MICROALBUMIN UR-MCNC: 6.7 MG/L (ref 0–20)
MICROALBUMIN/CREAT 24H UR: 7 MG/G CREATININE (ref 0–30)
RIGHT EYE DIABETIC RETINOPATHY: NORMAL
RIGHT EYE IMAGE QUALITY: NORMAL
RIGHT EYE MACULAR EDEMA: NORMAL
RIGHT EYE OTHER RETINOPATHY: NORMAL
SEVERITY (EYE EXAM): NORMAL
SL AMB POCT HEMOGLOBIN AIC: 9 (ref ?–6.5)

## 2022-07-29 PROCEDURE — 83036 HEMOGLOBIN GLYCOSYLATED A1C: CPT | Performed by: FAMILY MEDICINE

## 2022-07-29 PROCEDURE — 2025F 7 FLD RTA PHOTO W/O RTNOPTHY: CPT | Performed by: FAMILY MEDICINE

## 2022-07-29 PROCEDURE — 3061F NEG MICROALBUMINURIA REV: CPT | Performed by: FAMILY MEDICINE

## 2022-07-29 PROCEDURE — 3074F SYST BP LT 130 MM HG: CPT | Performed by: FAMILY MEDICINE

## 2022-07-29 PROCEDURE — 3052F HG A1C>EQUAL 8.0%<EQUAL 9.0%: CPT | Performed by: FAMILY MEDICINE

## 2022-07-29 PROCEDURE — 3725F SCREEN DEPRESSION PERFORMED: CPT | Performed by: FAMILY MEDICINE

## 2022-07-29 PROCEDURE — 3078F DIAST BP <80 MM HG: CPT | Performed by: FAMILY MEDICINE

## 2022-07-29 PROCEDURE — 82043 UR ALBUMIN QUANTITATIVE: CPT | Performed by: FAMILY MEDICINE

## 2022-07-29 PROCEDURE — 82570 ASSAY OF URINE CREATININE: CPT | Performed by: FAMILY MEDICINE

## 2022-07-29 PROCEDURE — 99386 PREV VISIT NEW AGE 40-64: CPT | Performed by: FAMILY MEDICINE

## 2022-07-29 RX ORDER — METFORMIN HYDROCHLORIDE 500 MG/1
1000 TABLET, EXTENDED RELEASE ORAL
Qty: 180 TABLET | Refills: 1 | Status: SHIPPED | OUTPATIENT
Start: 2022-07-29

## 2022-07-29 NOTE — PATIENT INSTRUCTIONS
Wellness Visit for Adults   AMBULATORY CARE:   A wellness visit  is when you see your healthcare provider to get screened for health problems  Your healthcare provider will also give you advice on how to stay healthy  Write down your questions so you remember to ask them  Ask your healthcare provider how often you should have a wellness visit  What happens at a wellness visit:  Your healthcare provider will ask about your health, and your family history of health problems  This includes high blood pressure, heart disease, and cancer  He or she will ask if you have symptoms that concern you, if you smoke, and about your mood  You may also be asked about your intake of medicines, supplements, food, and alcohol  Any of the following may be done:  · Your weight  will be checked  Your height may also be checked so your body mass index (BMI) can be calculated  Your BMI shows if you are at a healthy weight  · Your blood pressure  and heart rate will be checked  Your temperature may also be checked  · Blood and urine tests  may be done  Blood tests may be done to check your cholesterol levels  Abnormal cholesterol levels increase your risk for heart disease and stroke  You may also need a blood or urine test to check for diabetes if you are at increased risk  Urine tests may be done to look for signs of an infection or kidney disease  · A physical exam  includes checking your heartbeat and lungs with a stethoscope  Your healthcare provider may also check your skin to look for sun damage  · Screening tests  may be recommended  A screening test is done to check for diseases that may not cause symptoms  The screening tests you may need depend on your age, gender, family history, and lifestyle habits  For example, colorectal screening may be recommended if you are 48years old or older  Screening tests you need if you are a woman:   · A Pap smear  is used to screen for cervical cancer   Pap smears are usually done every 3 to 5 years depending on your age  You may need them more often if you have had abnormal Pap smear test results in the past  Ask your healthcare provider how often you should have a Pap smear  · A mammogram  is an x-ray of your breasts to screen for breast cancer  Experts recommend mammograms every 2 years starting at age 48 years  You may need a mammogram at age 52 years or younger if you have an increased risk for breast cancer  Talk to your healthcare provider about when you should start having mammograms and how often you need them  Vaccines you may need:   · Get an influenza vaccine  every year  The influenza vaccine protects you from the flu  Several types of viruses cause the flu  The viruses change over time, so new vaccines are made each year  · Get a tetanus-diphtheria (Td) booster vaccine  every 10 years  This vaccine protects you against tetanus and diphtheria  Tetanus is a severe infection that may cause painful muscle spasms and lockjaw  Diphtheria is a severe bacterial infection that causes a thick covering in the back of your mouth and throat  · Get a human papillomavirus (HPV) vaccine  if you are female and aged 23 to 32 or male 23 to 24 and never received it  This vaccine protects you from HPV infection  HPV is the most common infection spread by sexual contact  HPV may also cause vaginal, penile, and anal cancers  · Get a pneumococcal vaccine  if you are aged 72 years or older  The pneumococcal vaccine is an injection given to protect you from pneumococcal disease  Pneumococcal disease is an infection caused by pneumococcal bacteria  The infection may cause pneumonia, meningitis, or an ear infection  · Get a shingles vaccine  if you are 60 or older, even if you have had shingles before  The shingles vaccine is an injection to protect you from the varicella-zoster virus  This is the same virus that causes chickenpox   Shingles is a painful rash that develops in people who had chickenpox or have been exposed to the virus  How to eat healthy:  My Plate is a model for planning healthy meals  It shows the types and amounts of foods that should go on your plate  Fruits and vegetables make up about half of your plate, and grains and protein make up the other half  A serving of dairy is included on the side of your plate  The amount of calories and serving sizes you need depends on your age, gender, weight, and height  Examples of healthy foods are listed below:  · Eat a variety of vegetables  such as dark green, red, and orange vegetables  You can also include canned vegetables low in sodium (salt) and frozen vegetables without added butter or sauces  · Eat a variety of fresh fruits , canned fruit in 100% juice, frozen fruit, and dried fruit  · Include whole grains  At least half of the grains you eat should be whole grains  Examples include whole-wheat bread, wheat pasta, brown rice, and whole-grain cereals such as oatmeal     · Eat a variety of protein foods such as seafood (fish and shellfish), lean meat, and poultry without skin (turkey and chicken)  Examples of lean meats include pork leg, shoulder, or tenderloin, and beef round, sirloin, tenderloin, and extra lean ground beef  Other protein foods include eggs and egg substitutes, beans, peas, soy products, nuts, and seeds  · Choose low-fat dairy products such as skim or 1% milk or low-fat yogurt, cheese, and cottage cheese  · Limit unhealthy fats  such as butter, hard margarine, and shortening  Exercise:  Exercise at least 30 minutes per day on most days of the week  Some examples of exercise include walking, biking, dancing, and swimming  You can also fit in more physical activity by taking the stairs instead of the elevator or parking farther away from stores  Include muscle strengthening activities 2 days each week  Regular exercise provides many health benefits   It helps you manage your weight, and decreases your risk for type 2 diabetes, heart disease, stroke, and high blood pressure  Exercise can also help improve your mood  Ask your healthcare provider about the best exercise plan for you  General health and safety guidelines:   · Do not smoke  Nicotine and other chemicals in cigarettes and cigars can cause lung damage  Ask your healthcare provider for information if you currently smoke and need help to quit  E-cigarettes or smokeless tobacco still contain nicotine  Talk to your healthcare provider before you use these products  · Limit alcohol  A drink of alcohol is 12 ounces of beer, 5 ounces of wine, or 1½ ounces of liquor  · Lose weight, if needed  Being overweight increases your risk of certain health conditions  These include heart disease, high blood pressure, type 2 diabetes, and certain types of cancer  · Protect your skin  Do not sunbathe or use tanning beds  Use sunscreen with a SPF 15 or higher  Apply sunscreen at least 15 minutes before you go outside  Reapply sunscreen every 2 hours  Wear protective clothing, hats, and sunglasses when you are outside  · Drive safely  Always wear your seatbelt  Make sure everyone in your car wears a seatbelt  A seatbelt can save your life if you are in an accident  Do not use your cell phone when you are driving  This could distract you and cause an accident  Pull over if you need to make a call or send a text message  · Practice safe sex  Use latex condoms if are sexually active and have more than one partner  Your healthcare provider may recommend screening tests for sexually transmitted infections (STIs)  · Wear helmets, lifejackets, and protective gear  Always wear a helmet when you ride a bike or motorcycle, go skiing, or play sports that could cause a head injury  Wear protective equipment when you play sports  Wear a lifejacket when you are on a boat or doing water sports      © Copyright [employer] 2022 Information is for End User's use only and may not be sold, redistributed or otherwise used for commercial purposes  All illustrations and images included in CareNotes® are the copyrighted property of [employer]  or Desirae Chavez  The above information is an  only  It is not intended as medical advice for individual conditions or treatments  Talk to your doctor, nurse or pharmacist before following any medical regimen to see if it is safe and effective for you  Weight Management   AMBULATORY CARE:   Why it is important to manage your weight:  Being overweight increases your risk of health conditions such as heart disease, high blood pressure, type 2 diabetes, and certain types of cancer  It can also increase your risk for osteoarthritis, sleep apnea, and other respiratory problems  Aim for a slow, steady weight loss  Even a small amount of weight loss can lower your risk of health problems  Risks of being overweight:  Extra weight can cause many health problems, including the following:  · Diabetes (high blood sugar level)    · High blood pressure or high cholesterol    · Heart disease    · Stroke    · Gallbladder or liver disease    · Cancer of the colon, breast, prostate, liver, or kidney    · Sleep apnea    · Arthritis or gout    Screening  is done to check for health conditions before you have signs or symptoms  If you are 28to 79years old, your blood sugar level may be checked every 3 years for signs of prediabetes or diabetes  Your healthcare provider will check your blood pressure at each visit  High blood pressure can lead to a stroke or other problems  Your provider may check for signs of heart disease, cancer, or other health problems  How to lose weight safely:  A safe and healthy way to lose weight is to eat fewer calories and get regular exercise  · You can lose up about 1 pound a week by decreasing the number of calories you eat by 500 calories each day   You can decrease calories by eating smaller portion sizes or by cutting out high-calorie foods  Read labels to find out how many calories are in the foods you eat  · You can also burn calories with exercise such as walking, swimming, or biking  You will be more likely to keep weight off if you make these changes part of your lifestyle  Exercise at least 30 minutes per day on most days of the week  You can also fit in more physical activity by taking the stairs instead of the elevator or parking farther away from stores  Ask your healthcare provider about the best exercise plan for you  Healthy meal plan for weight management:  A healthy meal plan includes a variety of foods, contains fewer calories, and helps you stay healthy  A healthy meal plan includes the following:     · Eat whole-grain foods more often  A healthy meal plan should contain fiber  Fiber is the part of grains, fruits, and vegetables that is not broken down by your body  Whole-grain foods are healthy and provide extra fiber in your diet  Some examples of whole-grain foods are whole-wheat breads and pastas, oatmeal, brown rice, and bulgur  · Eat a variety of vegetables every day  Include dark, leafy greens such as spinach, kale, fabienne greens, and mustard greens  Eat yellow and orange vegetables such as carrots, sweet potatoes, and winter squash  · Eat a variety of fruits every day  Choose fresh or canned fruit (canned in its own juice or light syrup) instead of juice  Fruit juice has very little or no fiber  · Eat low-fat dairy foods  Drink fat-free (skim) milk or 1% milk  Eat fat-free yogurt and low-fat cottage cheese  Try low-fat cheeses such as mozzarella and other reduced-fat cheeses  · Choose meat and other protein foods that are low in fat  Choose beans or other legumes such as split peas or lentils  Choose fish, skinless poultry (chicken or turkey), or lean cuts of red meat (beef or pork)   Before you cook meat or poultry, cut off any visible fat  · Use less fat and oil  Try baking foods instead of frying them  Add less fat, such as margarine, sour cream, regular salad dressing and mayonnaise to foods  Eat fewer high-fat foods  Some examples of high-fat foods include french fries, doughnuts, ice cream, and cakes  · Eat fewer sweets  Limit foods and drinks that are high in sugar  This includes candy, cookies, regular soda, and sweetened drinks  Ways to decrease calories:   · Eat smaller portions  ? Use a small plate with smaller servings  ? Do not eat second helpings  ? When you eat at a restaurant, ask for a box and place half of your meal in the box before you eat  ? Share an entrée with someone else  · Replace high-calorie snacks with healthy, low-calorie snacks  ? Choose fresh fruit, vegetables, fat-free rice cakes, or air-popped popcorn instead of potato chips, nuts, or chocolate  ? Choose water or calorie-free drinks instead of soda or sweetened drinks  · Do not shop for groceries when you are hungry  You may be more likely to make unhealthy food choices  Take a grocery list of healthy foods and shop after you have eaten  · Eat regular meals  Do not skip meals  Skipping meals can lead to overeating later in the day  This can make it harder for you to lose weight  Eat a healthy snack in place of a meal if you do not have time to eat a regular meal  Talk with a dietitian to help you create a meal plan and schedule that is right for you  Other things to consider as you try to lose weight:   · Be aware of situations that may give you the urge to overeat, such as eating while watching television  Find ways to avoid these situations  For example, read a book, go for a walk, or do crafts  · Meet with a weight loss support group or friends who are also trying to lose weight  This may help you stay motivated to continue working on your weight loss goals      © Copyright [employer] 2022 Information is for End User's use only and may not be sold, redistributed or otherwise used for commercial purposes  All illustrations and images included in CareNotes® are the copyrighted property of [employer]  or Desirae Chavez  The above information is an  only  It is not intended as medical advice for individual conditions or treatments  Talk to your doctor, nurse or pharmacist before following any medical regimen to see if it is safe and effective for you

## 2022-07-29 NOTE — PROGRESS NOTES
[address]     NAME: Tomasa Norton  AGE: 64 y o  SEX: male  : 1960     DATE: 2022     Assessment and Plan:     Problem List Items Addressed This Visit        Other    Continuous opioid dependence ([facility] [address] )      Other Visit Diagnoses     Annual physical exam    -  Primary    Relevant Orders    PSA, Total Screen    Lipid Panel with Direct LDL reflex    CBC and differential    Comprehensive metabolic panel    Type 2 diabetes mellitus with diabetic polyneuropathy, without long-term current use of insulin (HCC)        Relevant Medications    metFORMIN (GLUCOPHAGE-XR) 500 mg 24 hr tablet    Semaglutide,0 25 or 0 5MG/DOS, 2 MG/1 5ML SOPN    Other Relevant Orders    POCT hemoglobin A1c (Completed)    IRIS Diabetic eye exam    Microalbumin / creatinine urine ratio    PSA, Total Screen    Lipid Panel with Direct LDL reflex    CBC and differential    Comprehensive metabolic panel    BMI 46 0-63 1,HZYVI        Relevant Orders    PSA, Total Screen    Lipid Panel with Direct LDL reflex    CBC and differential    Comprehensive metabolic panel    DDD (degenerative disc disease), cervical        Relevant Orders    XR spine cervical complete 4 or 5 vw non injury    DDD (degenerative disc disease), lumbar        Relevant Orders    XR spine lumbar minimum 4 views non injury    DDD (degenerative disc disease), thoracic        Relevant Orders    XR spine thoracic 3 vw        Immunizations and preventive care screenings were discussed with patient today  Appropriate education was printed on patient's after visit summary  Counseling:  Alcohol/drug use: discussed moderation in alcohol intake, the recommendations for healthy alcohol use, and avoidance of illicit drug use  Dental Health: discussed importance of regular tooth brushing, flossing, and dental visits    Sexual health: discussed sexually transmitted diseases, partner selection, use of condoms, avoidance of unintended pregnancy, and contraceptive alternatives  Exercise: the importance of regular exercise/physical activity was discussed  Recommend exercise 3-5 times per week for at least 30 minutes  BMI Counseling: Body mass index is 28 76 kg/m²  The BMI is above normal  Nutrition recommendations include decreasing portion sizes, encouraging healthy choices of fruits and vegetables, consuming healthier snacks, limiting drinks that contain sugar, moderation in carbohydrate intake, increasing intake of lean protein and reducing intake of cholesterol  Exercise recommendations include moderate physical activity 150 minutes/week and exercising 3-5 times per week  No pharmacotherapy was ordered  Rationale for BMI follow-up plan is due to patient being overweight or obese  Depression Screening and Follow-up Plan: Patient was screened for depression during today's encounter  They screened negative with a PHQ-2 score of 0  No follow-ups on file  Chief Complaint:     Chief Complaint   Patient presents with    New Patient Visit    Physical Exam    Joint Pain      History of Present Illness:     Adult Annual Physical   Patient here for a comprehensive physical exam  The patient reports problems - as documented below  Patient would like to stop taking his Xanax  Reports that at time she is taking 1-10 xanax at a time  On average 4-5 mg at a time  This comes from his Neurologist as well  States that he does not take the Oxycodone on the nights he takes Xanax  Notes that he is taking 75 mg of oxycodone when he takes it  He has been on this medication for 30 years  Following with Dr Adolfo Valdivia from Neurology    Diet and Physical Activity  Diet/Nutrition: well balanced diet and notes that he eats sweets nightly to help him deal with his pain  Exercise: walking at work ([employer])        Depression Screening  PHQ-2/9 Depression Screening    Little interest or pleasure in doing things: 0 - not at all  Feeling down, depressed, or hopeless: 0 - not at all  PHQ-2 Score: 0  PHQ-2 Interpretation: Negative depression screen       General Health  Sleep: sleeps poorly  Due to pain  Hearing: normal - bilateral   Vision: no vision problems  Dental: regular dental visits, brushes teeth twice daily and flosses teeth occasionally   Health  Symptoms include: none     Review of Systems:     Review of Systems   Constitutional: Negative for chills, fatigue and fever  HENT: Negative for congestion, ear pain, rhinorrhea, sinus pain and sore throat  Respiratory: Negative for cough and shortness of breath  Cardiovascular: Negative for chest pain and leg swelling  Gastrointestinal: Negative for abdominal pain, constipation, diarrhea, nausea and vomiting  Genitourinary: Negative for dysuria, frequency and urgency  Musculoskeletal: Positive for arthralgias  Negative for neck pain  Skin: Negative for rash  Neurological: Negative for dizziness, light-headedness and headaches  Psychiatric/Behavioral: Negative for sleep disturbance         Past Medical History:     Past Medical History:   Diagnosis Date    Chronic pain disorder     neck and back    Colon polyp     Cough     Diabetes ([facility] [address] )     Diabetes mellitus ([facility] [address] )     Hyperlipidemia     Scoliosis       Past Surgical History:     Past Surgical History:   Procedure Laterality Date    TOOTH EXTRACTION  06/2021      Family History:     Family History   Problem Relation Age of Onset    Hypertension Mother     Hyperlipidemia Mother     Prostate cancer Father     Heart disease Father     Stroke Father     Diabetes Father       Social History:     Social History     Socioeconomic History    Marital status: /Civil Union     Spouse name: None    Number of children: None    Years of education: None    Highest education level: None   Occupational History    Occupation: EMPLOYED   Tobacco Use  Smoking status: Never Smoker    Smokeless tobacco: Never Used   Vaping Use    Vaping Use: Never used   Substance and Sexual Activity    Alcohol use: Yes     Comment: rarely    Drug use: Never    Sexual activity: None   Other Topics Concern    None   Social History Narrative    None     Social Determinants of Health     Financial Resource Strain: Not on file   Food Insecurity: Not on file   Transportation Needs: Not on file   Physical Activity: Not on file   Stress: Not on file   Social Connections: Not on file   Intimate Partner Violence: Not on file   Housing Stability: Not on file      Current Medications:     Current Outpatient Medications   Medication Sig Dispense Refill    ALPRAZolam (XANAX) 1 mg tablet take 1 tablet by mouth every morning and 2 tablets by mouth AT NIGHT if needed      Boswellia-Glucosamine-Vit D (OSTEO BI-FLEX ONE PER DAY PO) Take by mouth      Calcium-Vitamin D-Vitamin K (CALCIUM + D + K PO) Take 2,400 mg by mouth      metFORMIN (GLUCOPHAGE-XR) 500 mg 24 hr tablet Take 2 tablets (1,000 mg total) by mouth daily with dinner 180 tablet 1    omeprazole (PriLOSEC) 20 mg delayed release capsule Take by mouth daily      oxyCODONE (ROXICODONE) 15 mg immediate release tablet take 1 tablet by mouth every 6 hours if needed for CHRONIC NECK PAIN      oxyCODONE (ROXICODONE) 5 immediate release tablet Take 5 mg by mouth every 4 (four) hours as needed      Semaglutide,0 25 or 0 5MG/DOS, 2 MG/1 5ML SOPN Inject 0 25 mg under the skin once a week 3 mL 1    VITAMIN D PO Take by mouth       No current facility-administered medications for this visit  Allergies:      Allergies   Allergen Reactions    Penicillins Hives     rash and hives      Prednisone Dizziness     myalgia        Physical Exam:     /72 (BP Location: Left arm, Patient Position: Sitting, Cuff Size: Large)   Pulse 90   Temp (!) 97 3 °F (36 3 °C)   Ht 6' 1" (1 854 m)   Wt 98 9 kg (218 lb)   SpO2 97%   BMI 28 76 kg/m²     Physical Exam  Vitals reviewed  Constitutional:       General: He is not in acute distress  Appearance: Normal appearance  HENT:      Head: Normocephalic and atraumatic  Right Ear: External ear normal       Left Ear: External ear normal       Nose: Nose normal       Mouth/Throat:      Mouth: Mucous membranes are moist    Eyes:      Extraocular Movements: Extraocular movements intact  Conjunctiva/sclera: Conjunctivae normal       Pupils: Pupils are equal, round, and reactive to light  Cardiovascular:      Rate and Rhythm: Normal rate and regular rhythm  Pulses: no weak pulses          Dorsalis pedis pulses are 2+ on the right side and 2+ on the left side  Posterior tibial pulses are 2+ on the right side and 2+ on the left side  Heart sounds: Normal heart sounds  Pulmonary:      Effort: Pulmonary effort is normal       Breath sounds: Normal breath sounds  No wheezing, rhonchi or rales  Abdominal:      General: Bowel sounds are normal  There is no distension  Palpations: Abdomen is soft  Tenderness: There is no abdominal tenderness  Musculoskeletal:      Cervical back: Neck supple  Right lower leg: No edema  Left lower leg: No edema  Feet:    Feet:      Right foot:      Skin integrity: No ulcer, skin breakdown, erythema, warmth, callus or dry skin  Left foot:      Skin integrity: No ulcer, skin breakdown, erythema, warmth, callus or dry skin  Lymphadenopathy:      Cervical: No cervical adenopathy  Skin:     General: Skin is warm  Capillary Refill: Capillary refill takes less than 2 seconds  Findings: No rash  Neurological:      Mental Status: He is alert  Mental status is at baseline  Patient's shoes and socks removed  Right Foot/Ankle   Right Foot Inspection  Skin Exam: skin normal and skin intact   No dry skin, no warmth, no callus, no erythema, no maceration, no abnormal color, no pre-ulcer, no ulcer and no callus  Toe Exam: ROM and strength within normal limits  Sensory   Vibration: intact  Proprioception: intact  Monofilament testing: intact    Vascular  Capillary refills: < 3 seconds  The right DP pulse is 2+  The right PT pulse is 2+  Left Foot/Ankle  Left Foot Inspection  Skin Exam: skin normal and skin intact  No dry skin, no warmth, no erythema, no maceration, normal color, no pre-ulcer, no ulcer and no callus  Toe Exam: ROM and strength within normal limits  Sensory   Vibration: intact  Proprioception: intact  Monofilament testing: intact    Vascular  Capillary refills: < 3 seconds  The left DP pulse is 2+  The left PT pulse is 2+       Assign Risk Category  No deformity present  Loss of protective sensation  No weak pulses  Risk: [address] Daren Boone, DO [facility] [phone]

## 2022-08-12 ENCOUNTER — HOSPITAL ENCOUNTER (OUTPATIENT)
Dept: RADIOLOGY | Facility: HOSPITAL | Age: 62
Discharge: HOME | End: 2022-08-12
Payer: COMMERCIAL

## 2022-08-12 DIAGNOSIS — M51.36 DDD (DEGENERATIVE DISC DISEASE), LUMBAR: ICD-10-CM

## 2022-08-12 DIAGNOSIS — M50.30 DDD (DEGENERATIVE DISC DISEASE), CERVICAL: ICD-10-CM

## 2022-08-12 DIAGNOSIS — M51.34 DDD (DEGENERATIVE DISC DISEASE), THORACIC: ICD-10-CM

## 2022-08-12 PROCEDURE — 72050 X-RAY EXAM NECK SPINE 4/5VWS: CPT

## 2022-08-12 PROCEDURE — 72072 X-RAY EXAM THORAC SPINE 3VWS: CPT

## 2022-08-12 PROCEDURE — 72110 X-RAY EXAM L-2 SPINE 4/>VWS: CPT

## 2022-08-22 ENCOUNTER — TELEPHONE (OUTPATIENT)
Dept: FAMILY MEDICINE CLINIC | Facility: CLINIC | Age: 62
End: 2022-08-22

## 2022-08-22 DIAGNOSIS — M51.36 DDD (DEGENERATIVE DISC DISEASE), LUMBAR: ICD-10-CM

## 2022-08-22 DIAGNOSIS — M51.34 DDD (DEGENERATIVE DISC DISEASE), THORACIC: ICD-10-CM

## 2022-08-22 DIAGNOSIS — M50.30 DDD (DEGENERATIVE DISC DISEASE), CERVICAL: Primary | ICD-10-CM

## 2022-08-22 NOTE — TELEPHONE ENCOUNTER
Pt returned results call -- gave pt Dr Isiah Olvera advisement -- pt does not feel that PT or ortho will be helpful, but he will try an appt to see what they say  He has been to pain mgmt, chiro, etc  without any help  Reports results are not a surprise, he explained to the doctor at his appt that he has very bad back problems and this is the reason he was given the pain medications he takes

## 2022-09-06 ENCOUNTER — OFFICE VISIT (OUTPATIENT)
Dept: OBGYN CLINIC | Facility: CLINIC | Age: 62
End: 2022-09-06
Payer: COMMERCIAL

## 2022-09-06 VITALS
SYSTOLIC BLOOD PRESSURE: 143 MMHG | HEIGHT: 73 IN | DIASTOLIC BLOOD PRESSURE: 91 MMHG | WEIGHT: 218 LBS | BODY MASS INDEX: 28.89 KG/M2 | HEART RATE: 88 BPM

## 2022-09-06 DIAGNOSIS — M51.34 DDD (DEGENERATIVE DISC DISEASE), THORACIC: ICD-10-CM

## 2022-09-06 DIAGNOSIS — M51.36 DDD (DEGENERATIVE DISC DISEASE), LUMBAR: ICD-10-CM

## 2022-09-06 DIAGNOSIS — M41.125 ADOLESCENT IDIOPATHIC SCOLIOSIS OF THORACOLUMBAR REGION: Primary | ICD-10-CM

## 2022-09-06 DIAGNOSIS — M50.30 DDD (DEGENERATIVE DISC DISEASE), CERVICAL: ICD-10-CM

## 2022-09-06 PROCEDURE — 3077F SYST BP >= 140 MM HG: CPT | Performed by: ORTHOPAEDIC SURGERY

## 2022-09-06 PROCEDURE — 99203 OFFICE O/P NEW LOW 30 MIN: CPT | Performed by: ORTHOPAEDIC SURGERY

## 2022-09-06 PROCEDURE — 3080F DIAST BP >= 90 MM HG: CPT | Performed by: ORTHOPAEDIC SURGERY

## 2022-09-06 NOTE — PROGRESS NOTES
[address] Krishnamurthy MD  [address] [facility] [address]  [phone]    HISTORY OF PRESENT ILLNESS:    Andi Lane is a 64 y o   male who presents with full back pain for greater than 30 years  Patient was referred by his PCP because he has been on a large amount medications  Patient was previously seen at [address]  Patient is treated by a neurologist who prescribes oxycodone 65-70 mg at night time, occasional twice per day  Patient is still actively working and cuts down trees  He reports his legs cramp a lot  Patient is a diabetic  The pain is worsened in the thoracolumbar region  Patient has numbness and tingling into hands and feet  Cervical stenosis from C2-C7  Patient is an  with some occasional tree service work  He was a  and guarded the [employer]       ALLERGIES:   Allergies   Allergen Reactions    Penicillins Hives     rash and hives      Prednisone Dizziness     myalgia         MEDICATIONS:    Current Outpatient Medications:     ALPRAZolam (XANAX) 1 mg tablet, take 1 tablet by mouth every morning and 2 tablets by mouth AT NIGHT if needed, Disp: , Rfl:     Boswellia-Glucosamine-Vit D (OSTEO BI-FLEX ONE PER DAY PO), Take by mouth, Disp: , Rfl:     Calcium-Vitamin D-Vitamin K (CALCIUM + D + K PO), Take 2,400 mg by mouth, Disp: , Rfl:     metFORMIN (GLUCOPHAGE-XR) 500 mg 24 hr tablet, Take 2 tablets (1,000 mg total) by mouth daily with dinner, Disp: 180 tablet, Rfl: 1    omeprazole (PriLOSEC) 20 mg delayed release capsule, Take by mouth daily, Disp: , Rfl:     oxyCODONE (ROXICODONE) 15 mg immediate release tablet, Take 70 mg by mouth every 12 (twelve) hours as needed , Disp: , Rfl:     Semaglutide,0 25 or 0 5MG/DOS, 2 MG/1 5ML SOPN, Inject 0 25 mg under the skin once a week, Disp: 3 mL, Rfl: 1    VITAMIN D PO, Take by mouth, Disp: , Rfl:      PAST MEDICAL HISTORY:   Past Medical History: Diagnosis Date    Chronic pain disorder     neck and back    Colon polyp     Cough     [facility] [address] [facility]     Diabetes mellitus ([facility] [address] )     Hyperlipidemia     Scoliosis        PAST SURGICAL HISTORY:  Past Surgical History:   Procedure Laterality Date    TOOTH EXTRACTION  06/2021       SOCIAL HISTORY:  Social History     Tobacco Use    Smoking status: Never Smoker    Smokeless tobacco: Never Used   Vaping Use    Vaping Use: Never used   Substance Use Topics    Alcohol use: Yes     Comment: rarely    Drug use: Never       ROS:  Review of Systems   Constitutional: Negative for appetite change and unexpected weight change  HENT: Negative for congestion and trouble swallowing  Eyes: Negative for visual disturbance  Respiratory: Negative for cough and shortness of breath  Cardiovascular: Negative for chest pain and palpitations  Gastrointestinal: Negative for nausea and vomiting  Endocrine: Negative for cold intolerance and heat intolerance  Musculoskeletal: Positive for back pain and neck pain  Negative for joint swelling and myalgias  Skin: Negative for rash  Neurological: Negative for numbness  PHYSICAL EXAM:  64 y o  male sitting comfortably on exam chair in no acute distress  Patient ambulates without normal gait  able to walk on heels/toes  able to balance on one leg  No TTP over cervical, thoracic, or lumbar spine  Normal sensation with 5/5 motor strength on right and 5/5 strength on left  ROM:  Flexion to 20 cm from ground  Limited flexion, gets to neutral      RADIOGRAPHIC STUDIES:  1  X-rays, C-spine, 08/12/2022:  Severe degenerative disc disease from C2-3 through C6-7  C7-T1 could not be visualized  There was also some facet arthrosis, and cervical kyphosis  X-rays, L-spine, 08/12/2022:  Idiopathic scoliosis with progression due to degenerative disc disease at the thoracolumbar junction    Mild compensatory care of in the lower lumbar spine with mild degenerative changes  Moderate to severe degenerative changes at the thoracolumbar junction  24 degree right-sided curve between T11 and L3 apex at L1     ASSESSMENT:  Problem List Items Addressed This Visit    None     Visit Diagnoses     Adolescent idiopathic scoliosis of thoracolumbar region    -  Primary    DDD (degenerative disc disease), cervical        DDD (degenerative disc disease), lumbar        DDD (degenerative disc disease), thoracic                PLAN:  Tarsha Webster is a 64 y o   male who presents with DDD of cervical, lumbar and thoracic region  He has been having neck and lower back pain for number of years but continues to be quite active  He has been under care pain management and more recently is getting his medication through neurology  Patients images were reviewed  He is quite functional and at this time is needed no further treatment respect to either his neck or lower back  The use of narcotics is always concerning  As long as precautions are taken, I see no reason the discontinued narcotics at this time  There is however role for tapering off the narcotics  Has been a high dose of narcotics for extended period of time and it is unlikely that is going to be able to get off  He is not a surgical candidate  Follow-up will be on as-needed basis         Scribe Attestation    I,:  Jose Osorio am acting as a scribe while in the presence of the attending physician :       I,:  Tamara Blue MD personally performed the services described in this documentation    as scribed in my presence :

## 2023-01-06 ENCOUNTER — TELEPHONE (OUTPATIENT)
Dept: FAMILY MEDICINE CLINIC | Facility: CLINIC | Age: 63
End: 2023-01-06

## 2023-01-06 DIAGNOSIS — U07.1 COVID-19: Primary | ICD-10-CM

## 2023-01-06 RX ORDER — GUAIFENESIN 600 MG/1
1200 TABLET, EXTENDED RELEASE ORAL EVERY 12 HOURS SCHEDULED
Qty: 20 TABLET | Refills: 0 | Status: SHIPPED | OUTPATIENT
Start: 2023-01-06

## 2023-01-06 NOTE — TELEPHONE ENCOUNTER
T/c from pt -- has been having s/s of covid for 1 week, took positive covid test today  Wanted to offer virtual appt for pt, but no openings at either office, and weekend is coming up  Pt reports he is not "awful" sick, but he is diabetic and takes pain killers  Would like Dr Namrata Prescott advisement/would like to know if there is a medication that can be sent in/he can take? Please advise

## 2023-02-06 ENCOUNTER — TELEPHONE (OUTPATIENT)
Dept: PULMONOLOGY | Facility: CLINIC | Age: 63
End: 2023-02-06

## 2023-04-18 ENCOUNTER — OFFICE VISIT (OUTPATIENT)
Dept: FAMILY MEDICINE CLINIC | Facility: CLINIC | Age: 63
End: 2023-04-18

## 2023-04-18 VITALS
WEIGHT: 218 LBS | SYSTOLIC BLOOD PRESSURE: 138 MMHG | TEMPERATURE: 99.6 F | HEIGHT: 73 IN | BODY MASS INDEX: 28.89 KG/M2 | DIASTOLIC BLOOD PRESSURE: 84 MMHG | HEART RATE: 86 BPM | OXYGEN SATURATION: 97 %

## 2023-04-18 DIAGNOSIS — E11.9 TYPE 2 DIABETES MELLITUS WITH HEMOGLOBIN A1C GOAL OF LESS THAN 7.0% (HCC): Primary | ICD-10-CM

## 2023-04-18 DIAGNOSIS — E11.42 TYPE 2 DIABETES MELLITUS WITH DIABETIC POLYNEUROPATHY, WITHOUT LONG-TERM CURRENT USE OF INSULIN (HCC): ICD-10-CM

## 2023-04-18 DIAGNOSIS — F11.20 CONTINUOUS OPIOID DEPENDENCE (HCC): ICD-10-CM

## 2023-04-18 LAB — SL AMB POCT HEMOGLOBIN AIC: 8.2 (ref ?–6.5)

## 2023-04-18 NOTE — PROGRESS NOTES
Name: Cornell Oppenheim      : 1960      MRN: [id number]  Encounter Provider: Jake Turcios DO  Encounter Date: 2023   Encounter department: [facility] [address] [facility] [address]     1  Type 2 diabetes mellitus with hemoglobin A1c goal of less than 7 0% ([facility])  -     POCT hemoglobin A1c  -     Microalbumin / creatinine urine ratio; Future; Expected date: 2023  -     Microalbumin / creatinine urine ratio    2  Type 2 diabetes mellitus with diabetic polyneuropathy, without long-term current use of insulin ([facility] [address] )    3  Continuous opioid dependence ([facility] [address] )  Discussed importance of starting GLP-1 for improved glucose control  Patient voiced understanding and will start medication  Follow up in 3 months  Subjective      HPI     Patient presents to the office for follow up  States that he has not been taking the Ozempic  Notes that he is taking his Metformin XR 1000 mg daily  He would like for his GI tract to get adjusted to the Metformin increased decker and then consider the Ozempic  Hgb A1c improved from 9 to 8 2  States that he plans to start the [address] in the near future as he has it at home       Review of Systems    Current Outpatient Medications on File Prior to Visit   Medication Sig   • ALPRAZolam (XANAX) 1 mg tablet take 1 tablet by mouth every morning and 2 tablets by mouth AT NIGHT if needed   • Boswellia-Glucosamine-Vit D (OSTEO BI-FLEX ONE PER DAY PO) Take by mouth   • Calcium-Vitamin D-Vitamin K (CALCIUM + D + K PO) Take 2,400 mg by mouth   • metFORMIN (GLUCOPHAGE-XR) 500 mg 24 hr tablet Take 2 tablets (1,000 mg total) by mouth daily with dinner   • omeprazole (PriLOSEC) 20 mg delayed release capsule Take by mouth daily   • oxyCODONE (ROXICODONE) 15 mg immediate release tablet Take 70 mg by mouth every 12 (twelve) hours as needed    • VITAMIN D PO Take by mouth   • guaiFENesin (MUCINEX) 600 mg 12 hr tablet Take 2 tablets (1,200 mg total) "by mouth every 12 (twelve) hours (Patient not taking: Reported on 4/18/2023)   • Semaglutide,0 25 or 0 5MG/DOS, 2 MG/1 5ML SOPN Inject 0 25 mg under the skin once a week (Patient not taking: Reported on 4/18/2023)     Objective     /84 (BP Location: Left arm, Patient Position: Sitting, Cuff Size: Adult)   Pulse 86   Temp 99 6 °F (37 6 °C)   Ht 6' 1\" (1 854 m)   Wt 98 9 kg (218 lb)   SpO2 97%   BMI 28 76 kg/m²     Physical Exam  Vitals reviewed  Constitutional:       General: He is not in acute distress  Appearance: Normal appearance  He is not ill-appearing  HENT:      Head: Normocephalic and atraumatic  Right Ear: External ear normal       Left Ear: External ear normal       Nose: Nose normal       Mouth/Throat:      Mouth: Mucous membranes are moist    Eyes:      Extraocular Movements: Extraocular movements intact  Conjunctiva/sclera: Conjunctivae normal    Cardiovascular:      Rate and Rhythm: Normal rate and regular rhythm  Heart sounds: Normal heart sounds  Pulmonary:      Effort: Pulmonary effort is normal  No respiratory distress  Breath sounds: Normal breath sounds  No wheezing  Abdominal:      General: Bowel sounds are normal  There is no distension  Palpations: Abdomen is soft  Tenderness: There is no abdominal tenderness  Musculoskeletal:      Right lower leg: No edema  Left lower leg: No edema  Skin:     General: Skin is warm  Capillary Refill: Capillary refill takes less than 2 seconds  Neurological:      General: No focal deficit present  Mental Status: He is alert  Mental status is at baseline          Espinoza Bone DO  "

## 2023-04-20 LAB
CREAT UR-MCNC: 50.1 MG/DL
MICROALBUMIN UR-MCNC: <5 MG/L (ref 0–20)
MICROALBUMIN/CREAT 24H UR: <10 MG/G CREATININE (ref 0–30)

## 2023-07-12 DIAGNOSIS — E11.42 TYPE 2 DIABETES MELLITUS WITH DIABETIC POLYNEUROPATHY, WITHOUT LONG-TERM CURRENT USE OF INSULIN (HCC): ICD-10-CM

## 2023-07-13 RX ORDER — METFORMIN HYDROCHLORIDE 500 MG/1
1000 TABLET, EXTENDED RELEASE ORAL
Qty: 180 TABLET | Refills: 0 | Status: SHIPPED | OUTPATIENT
Start: 2023-07-13

## 2023-08-03 ENCOUNTER — OFFICE VISIT (OUTPATIENT)
Dept: FAMILY MEDICINE CLINIC | Facility: CLINIC | Age: 63
End: 2023-08-03
Payer: COMMERCIAL

## 2023-08-03 VITALS
OXYGEN SATURATION: 98 % | DIASTOLIC BLOOD PRESSURE: 82 MMHG | WEIGHT: 210 LBS | HEART RATE: 94 BPM | BODY MASS INDEX: 27.83 KG/M2 | SYSTOLIC BLOOD PRESSURE: 134 MMHG | HEIGHT: 73 IN | TEMPERATURE: 98.1 F

## 2023-08-03 DIAGNOSIS — Z12.5 PROSTATE CANCER SCREENING: ICD-10-CM

## 2023-08-03 DIAGNOSIS — Z00.00 ANNUAL PHYSICAL EXAM: Primary | ICD-10-CM

## 2023-08-03 DIAGNOSIS — F11.20 CONTINUOUS OPIOID DEPENDENCE (HCC): ICD-10-CM

## 2023-08-03 DIAGNOSIS — M48.00 SPINAL STENOSIS, UNSPECIFIED SPINAL REGION: ICD-10-CM

## 2023-08-03 DIAGNOSIS — E11.42 TYPE 2 DIABETES MELLITUS WITH DIABETIC POLYNEUROPATHY, WITHOUT LONG-TERM CURRENT USE OF INSULIN (HCC): ICD-10-CM

## 2023-08-03 LAB — SL AMB POCT HEMOGLOBIN AIC: 8.3 (ref ?–6.5)

## 2023-08-03 PROCEDURE — 82570 ASSAY OF URINE CREATININE: CPT | Performed by: FAMILY MEDICINE

## 2023-08-03 PROCEDURE — 83036 HEMOGLOBIN GLYCOSYLATED A1C: CPT | Performed by: FAMILY MEDICINE

## 2023-08-03 PROCEDURE — 82043 UR ALBUMIN QUANTITATIVE: CPT | Performed by: FAMILY MEDICINE

## 2023-08-03 PROCEDURE — 99396 PREV VISIT EST AGE 40-64: CPT | Performed by: FAMILY MEDICINE

## 2023-08-08 ENCOUNTER — TELEPHONE (OUTPATIENT)
Dept: FAMILY MEDICINE CLINIC | Facility: CLINIC | Age: 63
End: 2023-08-08

## 2023-08-08 NOTE — TELEPHONE ENCOUNTER
Returned message - left a VM to RCB - "Normal ratio, no microalbumin in the urine." - Please call the office with any questions/concerns. Pt left a VM re: Message  - Transcribed VM:  "Larry, my name's Zelda Keita. [birth year] returning your call. Just trying to find out what you want. [address] [place] [address], [phone]. Thank you.

## 2023-10-11 DIAGNOSIS — E11.42 TYPE 2 DIABETES MELLITUS WITH DIABETIC POLYNEUROPATHY, WITHOUT LONG-TERM CURRENT USE OF INSULIN (HCC): ICD-10-CM

## 2023-10-11 RX ORDER — METFORMIN HYDROCHLORIDE 500 MG/1
1000 TABLET, EXTENDED RELEASE ORAL
Qty: 180 TABLET | Refills: 0 | Status: SHIPPED | OUTPATIENT
Start: 2023-10-11

## 2023-10-30 ENCOUNTER — TELEPHONE (OUTPATIENT)
Dept: FAMILY MEDICINE CLINIC | Facility: CLINIC | Age: 63
End: 2023-10-30
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
## 2023-10-30 RX ORDER — DOXYCYCLINE HYCLATE 100 MG
100 TABLET ORAL 2 TIMES DAILY
Qty: 28 TABLET | Refills: 0 | Status: SHIPPED | OUTPATIENT
Start: 2023-10-30 | End: 2023-11-13

## 2023-10-30 NOTE — TELEPHONE ENCOUNTER
T/c from pt-- found tick on himself on Sunday, was deer tick -- believes it was there since Saturday. Pt reports when he took the tick off it was dead, however, he thinks he may have a piece still in him. Would like to know if this piece is cause for concern, or if it will eventually come out/heal over?

## 2023-10-30 NOTE — TELEPHONE ENCOUNTER
Spoke to pt - he is having a bone graft with oral surgeon soon and will be on ABX for that - feels it will be too much ABX in a short period of time - he would rather be checked for Lyme's in a couple months - he said the tick was dead and dried out, crumbled in pieces - says he gets a lot on him every year and they always die on him due to his medications. Please advise.

## 2023-10-30 NOTE — TELEPHONE ENCOUNTER
If he does not want to take the ABX, we can test him in a few months that is fine.      Seth Davis DO  [facility]  10/30/2023 3:11 PM

## 2023-10-30 NOTE — TELEPHONE ENCOUNTER
Due to the amount of time this has been attached, I will treat for Lyme. Script sent to pharmacy.      Carolann Cruz [facility]  10/30/2023 2:22 PM

## 2023-10-30 NOTE — TELEPHONE ENCOUNTER
- Please review     Pt left a vm earlier re : tick   "Yes, my name is Galo Kirk, [phone]. I had called earlier about having a tick stuck in my leg and it was dead and I pulled it out. You asked me when I when I got the tick and I said Saturday. I meant to say a week ago Saturday. It's been at me for about 9 days, 8-9 days. OK, thank you, [phone].  Thank you."      Please advise

## 2024-01-12 DIAGNOSIS — E11.42 TYPE 2 DIABETES MELLITUS WITH DIABETIC POLYNEUROPATHY, WITHOUT LONG-TERM CURRENT USE OF INSULIN (HCC): ICD-10-CM

## 2024-01-12 RX ORDER — METFORMIN HYDROCHLORIDE 500 MG/1
1000 TABLET, EXTENDED RELEASE ORAL
Qty: 180 TABLET | Refills: 0 | Status: SHIPPED | OUTPATIENT
Start: 2024-01-12

## 2024-01-17 ENCOUNTER — APPOINTMENT (OUTPATIENT)
Dept: LAB | Facility: HOSPITAL | Age: 64
End: 2024-01-17
Payer: COMMERCIAL

## 2024-01-17 DIAGNOSIS — S40.862S: ICD-10-CM

## 2024-01-17 DIAGNOSIS — L08.9: ICD-10-CM

## 2024-01-17 DIAGNOSIS — E11.42 TYPE 2 DIABETES MELLITUS WITH DIABETIC POLYNEUROPATHY, WITHOUT LONG-TERM CURRENT USE OF INSULIN (HCC): ICD-10-CM
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
## 2024-01-17 DIAGNOSIS — Z12.5 PROSTATE CANCER SCREENING: ICD-10-CM

## 2024-01-17 DIAGNOSIS — Z79.899 ENCOUNTER FOR LONG-TERM (CURRENT) USE OF OTHER MEDICATIONS: ICD-10-CM

## 2024-01-17 LAB
ALBUMIN SERPL BCP-MCNC: 4.2 G/DL (ref 3.5–5)
ALP SERPL-CCNC: 55 U/L (ref 34–104)
ALT SERPL W P-5'-P-CCNC: 17 U/L (ref 7–52)
ANION GAP SERPL CALCULATED.3IONS-SCNC: 7 MMOL/L
AST SERPL W P-5'-P-CCNC: 16 U/L (ref 13–39)
B BURGDOR IGG+IGM SER QL IA: NEGATIVE
BASOPHILS # BLD AUTO: 0.02 THOUSANDS/ÂΜL (ref 0–0.1)
BASOPHILS NFR BLD AUTO: 0 % (ref 0–1)
BILIRUB SERPL-MCNC: 0.43 MG/DL (ref 0.2–1)
BUN SERPL-MCNC: 20 MG/DL (ref 5–25)
CALCIUM SERPL-MCNC: 10.1 MG/DL (ref 8.4–10.2)
CHLORIDE SERPL-SCNC: 103 MMOL/L (ref 96–108)
CHOLEST SERPL-MCNC: 245 MG/DL
CO2 SERPL-SCNC: 26 MMOL/L (ref 21–32)
CREAT SERPL-MCNC: 0.92 MG/DL (ref 0.6–1.3)
EOSINOPHIL # BLD AUTO: 0.12 THOUSAND/ÂΜL (ref 0–0.61)
EOSINOPHIL NFR BLD AUTO: 2 % (ref 0–6)
ERYTHROCYTE [DISTWIDTH] IN BLOOD BY AUTOMATED COUNT: 12.2 % (ref 11.6–15.1)
GFR SERPL CREATININE-BSD FRML MDRD: 88 ML/MIN/1.73SQ M
GLUCOSE SERPL-MCNC: 209 MG/DL (ref 65–140)
HCT VFR BLD AUTO: 39.7 % (ref 36.5–49.3)
HDLC SERPL-MCNC: 43 MG/DL
HGB BLD-MCNC: 14.7 G/DL (ref 12–17)
IMM GRANULOCYTES # BLD AUTO: 0.02 THOUSAND/UL (ref 0–0.2)
IMM GRANULOCYTES NFR BLD AUTO: 0 % (ref 0–2)
LDLC SERPL CALC-MCNC: 171 MG/DL (ref 0–100)
LYMPHOCYTES # BLD AUTO: 1.54 THOUSANDS/ÂΜL (ref 0.6–4.47)
LYMPHOCYTES NFR BLD AUTO: 22 % (ref 14–44)
MCH RBC QN AUTO: 31.7 PG (ref 26.8–34.3)
MCHC RBC AUTO-ENTMCNC: 37 G/DL (ref 31.4–37.4)
MCV RBC AUTO: 86 FL (ref 82–98)
MONOCYTES # BLD AUTO: 0.54 THOUSAND/ÂΜL (ref 0.17–1.22)
MONOCYTES NFR BLD AUTO: 8 % (ref 4–12)
NEUTROPHILS # BLD AUTO: 4.88 THOUSANDS/ÂΜL (ref 1.85–7.62)
NEUTS SEG NFR BLD AUTO: 68 % (ref 43–75)
NONHDLC SERPL-MCNC: 202 MG/DL
NRBC BLD AUTO-RTO: 0 /100 WBCS
PLATELET # BLD AUTO: 175 THOUSANDS/UL (ref 149–390)
PMV BLD AUTO: 8.8 FL (ref 8.9–12.7)
POTASSIUM SERPL-SCNC: 4.1 MMOL/L (ref 3.5–5.3)
PROT SERPL-MCNC: 7.2 G/DL (ref 6.4–8.4)
PSA SERPL-MCNC: 5.59 NG/ML (ref 0–4)
RBC # BLD AUTO: 4.63 MILLION/UL (ref 3.88–5.62)
SODIUM SERPL-SCNC: 136 MMOL/L (ref 135–147)
TRIGL SERPL-MCNC: 154 MG/DL
WBC # BLD AUTO: 7.12 THOUSAND/UL (ref 4.31–10.16)

## 2024-01-17 PROCEDURE — 80365 DRUG SCREENING OXYCODONE: CPT

## 2024-01-17 PROCEDURE — 80307 DRUG TEST PRSMV CHEM ANLYZR: CPT

## 2024-01-17 PROCEDURE — 80061 LIPID PANEL: CPT

## 2024-01-17 PROCEDURE — 83036 HEMOGLOBIN GLYCOSYLATED A1C: CPT | Performed by: FAMILY MEDICINE

## 2024-01-17 PROCEDURE — G0480 DRUG TEST DEF 1-7 CLASSES: HCPCS

## 2024-01-17 PROCEDURE — 80346 BENZODIAZEPINES1-12: CPT

## 2024-01-17 PROCEDURE — 80361 OPIATES 1 OR MORE: CPT

## 2024-01-17 PROCEDURE — 86618 LYME DISEASE ANTIBODY: CPT

## 2024-01-17 PROCEDURE — G0103 PSA SCREENING: HCPCS

## 2024-01-17 PROCEDURE — 80053 COMPREHEN METABOLIC PANEL: CPT

## 2024-01-17 PROCEDURE — 85025 COMPLETE CBC W/AUTO DIFF WBC: CPT

## 2024-01-17 PROCEDURE — 87468 ANAPLSMA PHGCYTOPHLM AMP PRB: CPT

## 2024-01-17 PROCEDURE — 36415 COLL VENOUS BLD VENIPUNCTURE: CPT

## 2024-01-18 ENCOUNTER — TELEPHONE (OUTPATIENT)
Dept: FAMILY MEDICINE CLINIC | Facility: CLINIC | Age: 64
End: 2024-01-18

## 2024-01-18 DIAGNOSIS — R97.20 ELEVATED PSA, LESS THAN 10 NG/ML: ICD-10-CM

## 2024-01-18 DIAGNOSIS — E11.9 TYPE 2 DIABETES MELLITUS WITH HEMOGLOBIN A1C GOAL OF LESS THAN 7.0% (HCC): Primary | ICD-10-CM

## 2024-01-18 LAB
EST. AVERAGE GLUCOSE BLD GHB EST-MCNC: 177 MG/DL
HBA1C MFR BLD: 7.8 %

## 2024-01-18 NOTE — TELEPHONE ENCOUNTER
T/c from pt -- pt was given Dr Allen's advisement. Pt will see urology, but would like to think about taking the statin as of right now. Pt will schedule with Urology and will call for a follow up with Dr Allen after he sees them.

## 2024-01-21 LAB
1OH-MIDAZOLAM UR CFM-MCNC: NOT DETECTED NG/MG CREAT
6MAM UR QL SCN: NEGATIVE NG/ML
7AMINOCLONAZEPAM UR CFM-MCNC: NOT DETECTED NG/MG CREAT
A-OH ALPRAZ UR QL CFM: 261 NG/MG CREAT
ACCEPTABLE CREAT UR QL: 176 MG/DL
ALPRAZ/CREAT UR CFM: 240 NG/MG CREAT
AMPHET UR QL SCN: NEGATIVE NG/ML
BARBITURATES UR QL SCN: NEGATIVE NG/ML
BENZODIAZ UR QL SCN: ABNORMAL NG/ML
BUPRENORPHINE UR QL CFM: NEGATIVE NG/ML
CANNABINOIDS UR QL SCN: NEGATIVE NG/ML
CARISOPRODOL UR QL: NEGATIVE NG/ML
CLONAZEPAM/CREAT UR CFM: NOT DETECTED NG/MG CREAT
COCAINE+BZE UR QL SCN: NEGATIVE NG/ML
CODEINE UR QL CFM: NOT DETECTED NG/MG CREAT
DHC UR CFM-MCNC: NOT DETECTED NG/MG CREAT
DIAZEPAM/CREAT UR: NOT DETECTED NG/MG CREAT
ETHYL GLUCURONIDE UR QL SCN: NEGATIVE NG/ML
FENTANYL UR QL SCN: NEGATIVE NG/ML
FLUNITRAZEPAM UR-MCNC: NOT DETECTED NG/MG CREAT
GABAPENTIN SERPLBLD QL SCN: NEGATIVE UG/ML
HYDROCODONE UR QL CFM: NOT DETECTED NG/MG CREAT
HYDROMORPHONE UR QL CFM: NOT DETECTED NG/MG CREAT
LORAZEPAM UR CFM-MCNC: NOT DETECTED NG/MG CREAT
METHADONE UR QL SCN: NEGATIVE NG/ML
MIDAZOLAM/CREAT UR CFM: NOT DETECTED NG/MG CREAT
MORPHINE UR QL CFM: NOT DETECTED NG/MG CREAT
NITRITE UR QL STRIP: NEGATIVE UG/ML
NORCODEINE/CREAT UR CFM: NOT DETECTED NG/MG CREAT
NORDIAZEPAM UR CFM-MCNC: NOT DETECTED NG/MG CREAT
NORFLUNITRAZEPAM UR-MCNC: NOT DETECTED NG/MG CREAT
NORHYDROCODONE UR CFM-MCNC: NOT DETECTED NG/MG CREAT
NORMORPHINE: NOT DETECTED NG/MG CREAT
NOROXYCODONE UR CFM-MCNC: >5682 NG/MG CREAT
NOROXYMORPHONE/CREAT UR CFM: 2461 NG/MG CREAT
OH-ETHYLFLURAZ UR CFM-MCNC: NOT DETECTED NG/MG CREAT
OH-TRIAZOLAM UR CFM-MCNC: NOT DETECTED NG/MG CREAT
OPIATES UR QL SCN: NEGATIVE NG/ML
OXAZEPAM CTO UR CFM-MCNC: NOT DETECTED NG/MG CREAT
OXYCODONE UR QL CFM: >5682 NG/MG CREAT
OXYCODONE+OXYMORPHONE UR QL SCN: ABNORMAL NG/ML
OXYMORPHONE UR QL CFM: 5519 NG/MG CREAT
PCP UR QL SCN: NEGATIVE NG/ML
PROPOXYPH UR QL SCN: NEGATIVE NG/ML
SPECIMEN PH ACCEPTABLE UR: 5 (ref 4.5–8.9)
TAPENTADOL UR QL SCN: NEGATIVE NG/ML
TEMAZEPAM UR CFM-MCNC: NOT DETECTED NG/MG CREAT
TRAMADOL UR QL SCN: NEGATIVE NG/ML

## 2024-01-24 LAB — A PHAGOCYTOPH DNA BLD QL NAA+PROBE: NEGATIVE

## 2024-02-06 ENCOUNTER — OFFICE VISIT (OUTPATIENT)
Dept: UROLOGY | Facility: CLINIC | Age: 64
End: 2024-02-06
Payer: COMMERCIAL

## 2024-02-06 VITALS
HEART RATE: 76 BPM | RESPIRATION RATE: 18 BRPM | SYSTOLIC BLOOD PRESSURE: 144 MMHG | WEIGHT: 200 LBS | BODY MASS INDEX: 26.51 KG/M2 | OXYGEN SATURATION: 96 % | HEIGHT: 73 IN | DIASTOLIC BLOOD PRESSURE: 84 MMHG

## 2024-02-06 DIAGNOSIS — R97.20 ELEVATED PSA, LESS THAN 10 NG/ML: ICD-10-CM

## 2024-02-06 PROCEDURE — 99203 OFFICE O/P NEW LOW 30 MIN: CPT | Performed by: PHYSICIAN ASSISTANT

## 2024-02-06 RX ORDER — IBUPROFEN 600 MG/1
600 TABLET ORAL EVERY 6 HOURS PRN
COMMUNITY
Start: 2023-12-05

## 2024-02-06 RX ORDER — OXYCODONE HYDROCHLORIDE 5 MG/1
TABLET ORAL
COMMUNITY
Start: 2024-01-22

## 2024-02-06 RX ORDER — CLINDAMYCIN HYDROCHLORIDE 150 MG/1
150 CAPSULE ORAL EVERY 6 HOURS
COMMUNITY
Start: 2023-12-05

## 2024-02-06 RX ORDER — OXYCODONE HYDROCHLORIDE 10 MG/1
TABLET ORAL
COMMUNITY
Start: 2024-01-22

## 2024-02-06 NOTE — PROGRESS NOTES
2/6/2024      Chief Complaint   Patient presents with    Follow-up     Assessment and Plan    63 y.o. male new patient     Elevated PSA  - Recent PSA from 1/17/24 was 5.59. Prior PSA on file from 2013 was 1.02  - MITALI negative  - Obtain repeat PSA. If remains elevated, recommend mpMRI for further evaluation and utilization of this for fusion guided prostate biopsy if needed.   - Will monitor for testing results.     History of Present Illness  Bar Amador is a 63 y.o. male here for new patient evaluation of elevated PSA.     He denies any prior history of elevated PSA in the past or prior prostate biopsy. Denies any prior  surgical manipulation. Denies any bothersome urinary complaints. Reports family history of prostate cancer in his father who reportedly passed away from the disease.     Review of Systems   Constitutional:  Negative for chills and fever.   Respiratory:  Negative for shortness of breath.    Cardiovascular:  Negative for chest pain.   Gastrointestinal:  Negative for abdominal pain.   Genitourinary:  Negative for difficulty urinating, dysuria, flank pain, frequency, hematuria and urgency.   Neurological:  Negative for dizziness.           AUA SYMPTOM SCORE      Flowsheet Row Most Recent Value   AUA SYMPTOM SCORE    How often have you had a sensation of not emptying your bladder completely after you finished urinating? 0 (P)    How often have you had to urinate again less than two hours after you finished urinating? 0 (P)    How often have you found you stopped and started again several times when you urinate? 5 (P)    How often have you found it difficult to postpone urination? 0 (P)    How often have you had a weak urinary stream? 5 (P)    How often have you had to push or strain to begin urination? 2 (P)    How many times did you most typically get up to urinate from the time you went to bed at night until the time you got up in the morning? 5 (P)    Quality of Life: If you were to spend the  rest of your life with your urinary condition just the way it is now, how would you feel about that? 2 (P)    AUA SYMPTOM SCORE 17 (P)                Past Medical History  Past Medical History:   Diagnosis Date    Chronic pain disorder     neck and back    Colon polyp     Cough     Diabetes (HCC)     Diabetes mellitus (HCC)     Hyperlipidemia     Scoliosis        Past Social History  Past Surgical History:   Procedure Laterality Date    TOOTH EXTRACTION  06/2021     Social History     Tobacco Use   Smoking Status Never   Smokeless Tobacco Never       Past Family History  Family History   Problem Relation Age of Onset    Hypertension Mother     Hyperlipidemia Mother     Prostate cancer Father     Heart disease Father     Stroke Father     Diabetes Father     Cancer Father        Past Social history  Social History     Socioeconomic History    Marital status: /Civil Union     Spouse name: Not on file    Number of children: Not on file    Years of education: Not on file    Highest education level: Not on file   Occupational History    Occupation: EMPLOYED   Tobacco Use    Smoking status: Never    Smokeless tobacco: Never   Vaping Use    Vaping status: Never Used   Substance and Sexual Activity    Alcohol use: Not Currently     Comment: rarely    Drug use: Never    Sexual activity: Yes     Partners: Female     Birth control/protection: Post-menopausal, None   Other Topics Concern    Not on file   Social History Narrative    Not on file     Social Determinants of Health     Financial Resource Strain: Not on file   Food Insecurity: No Food Insecurity (8/11/2020)    Received from [facility]    Hunger Vital Sign     Worried About Running Out of Food in the Last Year: Never true     Ran Out of Food in the Last Year: Never true   Transportation Needs: Not on file   Physical Activity: Not on file   Stress: Not on file   Social Connections: Not on file   Intimate Partner Violence: Not on file   Housing Stability: Not on  "file       Current Medications  Current Outpatient Medications   Medication Sig Dispense Refill    ALPRAZolam (XANAX) 1 mg tablet take 1 tablet by mouth every morning and 2 tablets by mouth AT NIGHT if needed      Boswellia-Glucosamine-Vit D (OSTEO BI-FLEX ONE PER DAY PO) Take by mouth      Calcium-Vitamin D-Vitamin K (CALCIUM + D + K PO) Take 2,400 mg by mouth      clindamycin (CLEOCIN) 150 mg capsule Take 150 mg by mouth every 6 (six) hours      ibuprofen (MOTRIN) 600 mg tablet Take 600 mg by mouth every 6 (six) hours as needed      metFORMIN (GLUCOPHAGE-XR) 500 mg 24 hr tablet Take 2 tablets (1,000 mg total) by mouth daily with dinner 180 tablet 0    omeprazole (PriLOSEC) 20 mg delayed release capsule Take by mouth daily      oxyCODONE (ROXICODONE) 10 MG TABS take 2 tablets by mouth every 6 hours if needed for MODERATE TO S...  (REFER TO PRESCRIPTION NOTES).      oxyCODONE (ROXICODONE) 15 mg immediate release tablet Take 70 mg by mouth every 12 (twelve) hours as needed       oxyCODONE (ROXICODONE) 5 immediate release tablet take 1 tablet by mouth every 4 hours if needed for MODERATE TO SEVERE PAIN      sitaGLIPtin (JANUVIA) 50 mg tablet Take 1 tablet (50 mg total) by mouth daily 30 tablet 5    VITAMIN D PO Take by mouth       No current facility-administered medications for this visit.       Allergies  Allergies   Allergen Reactions    Penicillins Hives     rash and hives      Prednisone Dizziness     myalgia           The following portions of the patient's history were reviewed and updated as appropriate: allergies, current medications, past medical history, past social history, past surgical history and problem list.      Vitals  Vitals:    02/06/24 1316   BP: 144/84   BP Location: Left arm   Patient Position: Sitting   Pulse: 76   Resp: 18   SpO2: 96%   Weight: 90.7 kg (200 lb)   Height: 6' 1\" (1.854 m)           Physical Exam  Physical Exam  Constitutional:       Appearance: Normal appearance.   HENT:      " Head: Normocephalic and atraumatic.      Right Ear: External ear normal.      Left Ear: External ear normal.      Nose: Nose normal.   Eyes:      General: No scleral icterus.     Conjunctiva/sclera: Conjunctivae normal.   Cardiovascular:      Pulses: Normal pulses.   Pulmonary:      Effort: Pulmonary effort is normal.   Genitourinary:     Comments: No prostatic nodularity or tenderness  Musculoskeletal:         General: Normal range of motion.      Cervical back: Normal range of motion.   Skin:     General: Skin is warm and dry.   Neurological:      General: No focal deficit present.      Mental Status: He is alert and oriented to person, place, and time.   Psychiatric:         Mood and Affect: Mood normal.         Behavior: Behavior normal.         Thought Content: Thought content normal.         Judgment: Judgment normal.           Results  No results found for this or any previous visit (from the past 1 hour(s)).]  Lab Results   Component Value Date    PSA 5.59 (H) 01/17/2024     Lab Results   Component Value Date    CALCIUM 10.1 01/17/2024    K 4.1 01/17/2024    CO2 26 01/17/2024     01/17/2024    BUN 20 01/17/2024    CREATININE 0.92 01/17/2024     Lab Results   Component Value Date    WBC 7.12 01/17/2024    HGB 14.7 01/17/2024    HCT 39.7 01/17/2024    MCV 86 01/17/2024     01/17/2024           Orders  Orders Placed This Encounter   Procedures    PSA Total, Diagnostic     Standing Status:   Future     Standing Expiration Date:   2/6/2025       Chantal Barksdale

## 2024-02-14 ENCOUNTER — APPOINTMENT (OUTPATIENT)
Dept: LAB | Facility: HOSPITAL | Age: 64
End: 2024-02-14
Payer: COMMERCIAL

## 2024-02-14 DIAGNOSIS — R97.20 ELEVATED PSA, LESS THAN 10 NG/ML: ICD-10-CM

## 2024-02-14 LAB — PSA SERPL-MCNC: 6.18 NG/ML (ref 0–4)

## 2024-02-14 PROCEDURE — 84153 ASSAY OF PSA TOTAL: CPT

## 2024-02-14 PROCEDURE — 36415 COLL VENOUS BLD VENIPUNCTURE: CPT

## 2024-02-15 ENCOUNTER — TELEPHONE (OUTPATIENT)
Dept: UROLOGY | Facility: CLINIC | Age: 64
End: 2024-02-15

## 2024-02-15 DIAGNOSIS — R97.20 ELEVATED PSA, LESS THAN 10 NG/ML: Primary | ICD-10-CM

## 2024-02-15 NOTE — TELEPHONE ENCOUNTER
Called and spoke with pt informing pt PSA increased to 6.18. Prostate MRI ordered. Pt verbalized understanding. Pt asked me to call back and leave a detailed message. Providing central scheduling number and office number.     ----- Message from Chantal Barksdale PA-C sent at 2/15/2024  9:10 AM EST -----  PSA increased to 6.18. Prostate MRI ordered. Please assist with scheduling and arrange follow up to review imaging

## 2024-02-16 DIAGNOSIS — R97.20 ELEVATED PSA, LESS THAN 10 NG/ML: Primary | ICD-10-CM

## 2024-02-16 NOTE — TELEPHONE ENCOUNTER
The other option would just be proceeding with an in-office TRUS prostate biopsy which I would recommend if MRIs are scheduling out that far

## 2024-02-16 NOTE — TELEPHONE ENCOUNTER
Patient states that he must be sedated in order to have the MRI prostate.    Pt states due to pain of shoulders and spine, and claustrophobia.     Pt states that propofol is the only thing that works for him.    Please review.    Call back [phone]

## 2024-02-16 NOTE — TELEPHONE ENCOUNTER
Patient calling to find out if there is any other options because he is not able to get his MRI under sedation until July.    He stated he can do the normal MRI if he is given a stronger medication then xanax and valium because they don't work for him    Please advise    Patient requesting a call back at [phone]

## 2024-02-19 DIAGNOSIS — E11.42 TYPE 2 DIABETES MELLITUS WITH DIABETIC POLYNEUROPATHY, WITHOUT LONG-TERM CURRENT USE OF INSULIN (HCC): ICD-10-CM

## 2024-02-20 NOTE — TELEPHONE ENCOUNTER
Patient called today stating that his MRI appointment isn't until 7/24 at 3 PM due to him needing to be sedated.    Pt is questioning if he can have a CT Scan instead? Says that he believes that he can handle that versus an MRI.    Please review.    Call back [phone]

## 2024-02-21 NOTE — TELEPHONE ENCOUNTER
Called an spoke with pt relaying Chantal CERVANTES message   CT is not an option. If unable to have MRI would recommend TRUS prostate biopsy   Pt stated he will call tomorrow to make appt. Provided office number.

## 2024-03-24 DIAGNOSIS — E11.42 TYPE 2 DIABETES MELLITUS WITH DIABETIC POLYNEUROPATHY, WITHOUT LONG-TERM CURRENT USE OF INSULIN (HCC): ICD-10-CM

## 2024-03-26 ENCOUNTER — PROCEDURE VISIT (OUTPATIENT)
Dept: UROLOGY | Facility: CLINIC | Age: 64
End: 2024-03-26
Payer: COMMERCIAL

## 2024-03-26 ENCOUNTER — TELEPHONE (OUTPATIENT)
Dept: UROLOGY | Facility: CLINIC | Age: 64
End: 2024-03-26

## 2024-03-26 VITALS
BODY MASS INDEX: 28.23 KG/M2 | OXYGEN SATURATION: 96 % | SYSTOLIC BLOOD PRESSURE: 140 MMHG | WEIGHT: 213 LBS | HEART RATE: 81 BPM | HEIGHT: 73 IN | DIASTOLIC BLOOD PRESSURE: 84 MMHG

## 2024-03-26 DIAGNOSIS — R97.20 ELEVATED PSA, LESS THAN 10 NG/ML: Primary | ICD-10-CM

## 2024-03-26 PROCEDURE — G0416 PROSTATE BIOPSY, ANY MTHD: HCPCS | Performed by: PATHOLOGY

## 2024-03-26 PROCEDURE — 88344 IMHCHEM/IMCYTCHM EA MLT ANTB: CPT | Performed by: PATHOLOGY

## 2024-03-26 PROCEDURE — 76942 ECHO GUIDE FOR BIOPSY: CPT | Performed by: UROLOGY

## 2024-03-26 PROCEDURE — 96372 THER/PROPH/DIAG INJ SC/IM: CPT

## 2024-03-26 PROCEDURE — 55700 PR PROSTATE NEEDLE BIOPSY ANY APPROACH: CPT | Performed by: UROLOGY

## 2024-03-26 RX ORDER — GENTAMICIN SULFATE 40 MG/ML
1 INJECTION, SOLUTION INTRAMUSCULAR; INTRAVENOUS EVERY 8 HOURS
Status: DISCONTINUED | OUTPATIENT
Start: 2024-03-26 | End: 2024-03-26

## 2024-03-26 RX ADMIN — GENTAMICIN SULFATE 80 MG: 40 INJECTION, SOLUTION INTRAMUSCULAR; INTRAVENOUS at 08:11

## 2024-03-26 NOTE — TELEPHONE ENCOUNTER
Patient has a biopsy scheduled today with [facility] this morning and no follow up to go over results is scheduled. No OVL spots available in time frame. Please advise on an appointment for results. Thank you.

## 2024-03-26 NOTE — PROGRESS NOTES
Office TRUS-guided Prostate Biopsy Procedure Note    Indication    Elevated PSA    Informed consent   The risks, benefits and alternatives to TRUS-guided prostate biopsy were conveyed to the patient prior to performing the procedure. A discussion of the risks of the procedure included, but was not limited to: pain, hematuria, hematochezia, hematospermia, infection, and the possibility of a non-diagnostic biopsy. The patient was given the opportunity to have his questions answered and there was no perceived barrier to education.    Antibiotic prophylaxis   The patient received the following antibiotics at least 30 minutes prior to undergoing biopsy: Ciprofloxacin 500 mg PO x2.  1 g intramuscular Rocephin    Rectal cleansing  The patient was instructed to perform an evacuating rectal enema 1-2 hours prior to biopsy.    Local anesthesia  Topical 2% lidocaine jelly was applied liberally to the anus and rectum and allowed to dwell for at least 5 min prior to starting the procedure.  After insertion of the TRUS probe, 10 mL of 2% lidocaine solution was injected with ultrasound guidance at the  junction of the prostate and seminal vesicles. The anesthetic was allowed to dwell for at least 2 minutes prior to biopsy.    Transrectal ultrasonography  The patient was placed in the left lateral decubitus position. After an attentive digital rectal examination, a 7.5 mHz sidefire ultrasound probe was gently inserted into the rectum and biplanar imaging of the prostate was done with the findings noted below. Images were taken of any abnormal findings and also to document prostate size.    Bladder  The bladder base appeared normal.    Prostate  Digital rectal exam findings:  -Firm right-sided gland, consistent with clinical T2c exam    Ultrasound size measurements:  -Volume:  65 cm3    Ultrasound findings:  -Cysts: None  -Masses: None  -Median lobe: absent    Clinical stage (assuming a positive biopsy):   -T2c.    TRUS-guided  needle biopsy  Using an 18 gauge biopsy needle and ultrasound guidance, the following biopsies were taken:    1 core(s) from the left lateral base.  1 core(s) from the left lateral mid-gland.  1 core(s) from the right middle base.  1 core(s) from the right lateral base.  1 core(s) from the left lateral mid-gland.  1 core(s) from the left middle mid-gland.  1 core(s) from the right middle mid-gland.  1 core(s) from the right lateral mid-gland.  1 core(s) from the left lateral apex.  1 core(s) from the left middle apex.  1 core(s) from the right middle apex.  1 core(s) from the right lateral apex    Total number of cores: 12                Complications  There were no procedural complications.    Disposition  The patient was dismissed to home after 30 minutes of observation in stable condition.    Post-procedure instructions:     Today he underwent an uncomplicated transrectal ultrasound-guided biopsy of the prostate, following a periprosthetic nerve block.I reviewed the normal postprocedure a course including bleeding per recutm, hematuria, and hematospermia.  I instructed him to complete his course of antibiotics as prescribed. Instructed him to call with fever greater than 101, chills, nausea, vomiting, poorly controlled pain. His followup was scheduled in approximately 2 weeks' time to review the pathology.

## 2024-03-28 ENCOUNTER — APPOINTMENT (INPATIENT)
Dept: CT IMAGING | Facility: HOSPITAL | Age: 64
DRG: 872 | End: 2024-03-28
Payer: COMMERCIAL

## 2024-03-28 ENCOUNTER — HOSPITAL ENCOUNTER (INPATIENT)
Facility: HOSPITAL | Age: 64
LOS: 2 days | Discharge: HOME | DRG: 872 | End: 2024-03-30
Attending: EMERGENCY MEDICINE | Admitting: FAMILY MEDICINE
Payer: COMMERCIAL

## 2024-03-28 ENCOUNTER — NURSE TRIAGE (OUTPATIENT)
Age: 64
End: 2024-03-28

## 2024-03-28 DIAGNOSIS — N39.0 URINARY TRACT INFECTION: ICD-10-CM

## 2024-03-28 DIAGNOSIS — K92.1 BLOOD IN STOOL: ICD-10-CM

## 2024-03-28 DIAGNOSIS — A41.9 SEPSIS (HCC): Primary | ICD-10-CM

## 2024-03-28 PROBLEM — Z98.890 H/O PROSTATE BIOPSY: Status: ACTIVE | Noted: 2024-03-28

## 2024-03-28 PROBLEM — K92.2 GIB (GASTROINTESTINAL BLEEDING): Status: ACTIVE | Noted: 2024-03-28

## 2024-03-28 LAB
ALBUMIN SERPL BCP-MCNC: 4.4 G/DL (ref 3.5–5)
ALP SERPL-CCNC: 57 U/L (ref 34–104)
ALT SERPL W P-5'-P-CCNC: 16 U/L (ref 7–52)
ANION GAP SERPL CALCULATED.3IONS-SCNC: 8 MMOL/L (ref 4–13)
APTT PPP: 28 SECONDS (ref 23–37)
AST SERPL W P-5'-P-CCNC: 18 U/L (ref 13–39)
BACTERIA UR QL AUTO: ABNORMAL /HPF
BASOPHILS # BLD AUTO: 0.03 THOUSANDS/ÂΜL (ref 0–0.1)
BASOPHILS NFR BLD AUTO: 0 % (ref 0–1)
BILIRUB SERPL-MCNC: 0.81 MG/DL (ref 0.2–1)
BILIRUB UR QL STRIP: NEGATIVE
BUN SERPL-MCNC: 13 MG/DL (ref 5–25)
CALCIUM SERPL-MCNC: 10.2 MG/DL (ref 8.4–10.2)
CHLORIDE SERPL-SCNC: 102 MMOL/L (ref 96–108)
CLARITY UR: CLEAR
CO2 SERPL-SCNC: 26 MMOL/L (ref 21–32)
COLOR UR: ABNORMAL
CREAT SERPL-MCNC: 0.83 MG/DL (ref 0.6–1.3)
EOSINOPHIL # BLD AUTO: 0.02 THOUSAND/ÂΜL (ref 0–0.61)
EOSINOPHIL NFR BLD AUTO: 0 % (ref 0–6)
ERYTHROCYTE [DISTWIDTH] IN BLOOD BY AUTOMATED COUNT: 12 % (ref 11.6–15.1)
GFR SERPL CREATININE-BSD FRML MDRD: 93 ML/MIN/1.73SQ M
GLUCOSE SERPL-MCNC: 225 MG/DL (ref 65–140)
GLUCOSE SERPL-MCNC: 240 MG/DL (ref 65–140)
GLUCOSE SERPL-MCNC: 282 MG/DL (ref 65–140)
GLUCOSE UR STRIP-MCNC: ABNORMAL MG/DL
HCT VFR BLD AUTO: 38.6 % (ref 36.5–49.3)
HGB BLD-MCNC: 14 G/DL (ref 12–17)
HGB UR QL STRIP.AUTO: ABNORMAL
IMM GRANULOCYTES # BLD AUTO: 0.04 THOUSAND/UL (ref 0–0.2)
IMM GRANULOCYTES NFR BLD AUTO: 0 % (ref 0–2)
INR PPP: 1.06 (ref 0.84–1.19)
KETONES UR STRIP-MCNC: NEGATIVE MG/DL
LACTATE SERPL-SCNC: 1.8 MMOL/L (ref 0.5–2)
LEUKOCYTE ESTERASE UR QL STRIP: ABNORMAL
LYMPHOCYTES # BLD AUTO: 2.08 THOUSANDS/ÂΜL (ref 0.6–4.47)
LYMPHOCYTES NFR BLD AUTO: 14 % (ref 14–44)
MCH RBC QN AUTO: 31.4 PG (ref 26.8–34.3)
MCHC RBC AUTO-ENTMCNC: 36.3 G/DL (ref 31.4–37.4)
MCV RBC AUTO: 87 FL (ref 82–98)
MONOCYTES # BLD AUTO: 1.38 THOUSAND/ÂΜL (ref 0.17–1.22)
MONOCYTES NFR BLD AUTO: 10 % (ref 4–12)
MUCOUS THREADS UR QL AUTO: ABNORMAL
NEUTROPHILS # BLD AUTO: 10.95 THOUSANDS/ÂΜL (ref 1.85–7.62)
NEUTS SEG NFR BLD AUTO: 76 % (ref 43–75)
NITRITE UR QL STRIP: POSITIVE
NON-SQ EPI CELLS URNS QL MICRO: ABNORMAL /HPF
NRBC BLD AUTO-RTO: 0 /100 WBCS
PH UR STRIP.AUTO: 5 [PH]
PLATELET # BLD AUTO: 153 THOUSANDS/UL (ref 149–390)
PMV BLD AUTO: 8.8 FL (ref 8.9–12.7)
POTASSIUM SERPL-SCNC: 4.1 MMOL/L (ref 3.5–5.3)
PROCALCITONIN SERPL-MCNC: 0.09 NG/ML
PROT SERPL-MCNC: 7.8 G/DL (ref 6.4–8.4)
PROT UR STRIP-MCNC: NEGATIVE MG/DL
PROTHROMBIN TIME: 14.4 SECONDS (ref 11.6–14.5)
RBC # BLD AUTO: 4.46 MILLION/UL (ref 3.88–5.62)
RBC #/AREA URNS AUTO: ABNORMAL /HPF
SODIUM SERPL-SCNC: 136 MMOL/L (ref 135–147)
SP GR UR STRIP.AUTO: 1.03 (ref 1–1.03)
UROBILINOGEN UR STRIP-ACNC: <2 MG/DL
WBC # BLD AUTO: 14.5 THOUSAND/UL (ref 4.31–10.16)
WBC #/AREA URNS AUTO: ABNORMAL /HPF

## 2024-03-28 PROCEDURE — 81001 URINALYSIS AUTO W/SCOPE: CPT | Performed by: PHYSICIAN ASSISTANT

## 2024-03-28 PROCEDURE — G0416 PROSTATE BIOPSY, ANY MTHD: HCPCS | Performed by: PATHOLOGY

## 2024-03-28 PROCEDURE — 83605 ASSAY OF LACTIC ACID: CPT | Performed by: PHYSICIAN ASSISTANT

## 2024-03-28 PROCEDURE — 87040 BLOOD CULTURE FOR BACTERIA: CPT | Performed by: PHYSICIAN ASSISTANT

## 2024-03-28 PROCEDURE — 74177 CT ABD & PELVIS W/CONTRAST: CPT

## 2024-03-28 PROCEDURE — 88344 IMHCHEM/IMCYTCHM EA MLT ANTB: CPT | Performed by: PATHOLOGY

## 2024-03-28 PROCEDURE — 87086 URINE CULTURE/COLONY COUNT: CPT | Performed by: PHYSICIAN ASSISTANT

## 2024-03-28 PROCEDURE — 80053 COMPREHEN METABOLIC PANEL: CPT | Performed by: PHYSICIAN ASSISTANT

## 2024-03-28 PROCEDURE — 87186 SC STD MICRODIL/AGAR DIL: CPT | Performed by: PHYSICIAN ASSISTANT

## 2024-03-28 PROCEDURE — 96361 HYDRATE IV INFUSION ADD-ON: CPT

## 2024-03-28 PROCEDURE — 85730 THROMBOPLASTIN TIME PARTIAL: CPT | Performed by: PHYSICIAN ASSISTANT

## 2024-03-28 PROCEDURE — 84145 PROCALCITONIN (PCT): CPT | Performed by: PHYSICIAN ASSISTANT

## 2024-03-28 PROCEDURE — 85610 PROTHROMBIN TIME: CPT | Performed by: PHYSICIAN ASSISTANT

## 2024-03-28 PROCEDURE — 99223 1ST HOSP IP/OBS HIGH 75: CPT | Performed by: FAMILY MEDICINE

## 2024-03-28 PROCEDURE — 85025 COMPLETE CBC W/AUTO DIFF WBC: CPT | Performed by: PHYSICIAN ASSISTANT

## 2024-03-28 PROCEDURE — 82948 REAGENT STRIP/BLOOD GLUCOSE: CPT

## 2024-03-28 PROCEDURE — 99285 EMERGENCY DEPT VISIT HI MDM: CPT | Performed by: PHYSICIAN ASSISTANT

## 2024-03-28 PROCEDURE — 99285 EMERGENCY DEPT VISIT HI MDM: CPT

## 2024-03-28 PROCEDURE — 36415 COLL VENOUS BLD VENIPUNCTURE: CPT | Performed by: PHYSICIAN ASSISTANT

## 2024-03-28 PROCEDURE — 87077 CULTURE AEROBIC IDENTIFY: CPT | Performed by: PHYSICIAN ASSISTANT

## 2024-03-28 PROCEDURE — 96374 THER/PROPH/DIAG INJ IV PUSH: CPT

## 2024-03-28 RX ORDER — OXYCODONE HYDROCHLORIDE 10 MG/1
10 TABLET ORAL EVERY 4 HOURS PRN
Status: DISCONTINUED | OUTPATIENT
Start: 2024-03-28 | End: 2024-03-28

## 2024-03-28 RX ORDER — OXYCODONE HYDROCHLORIDE 5 MG/1
5 TABLET ORAL EVERY 4 HOURS PRN
Status: DISCONTINUED | OUTPATIENT
Start: 2024-03-29 | End: 2024-03-29

## 2024-03-28 RX ORDER — OXYCODONE HYDROCHLORIDE 5 MG/1
5 TABLET ORAL EVERY 4 HOURS PRN
Status: DISCONTINUED | OUTPATIENT
Start: 2024-03-28 | End: 2024-03-28

## 2024-03-28 RX ORDER — CEFTRIAXONE 2 G/50ML
2000 INJECTION, SOLUTION INTRAVENOUS ONCE
Status: DISCONTINUED | OUTPATIENT
Start: 2024-03-29 | End: 2024-03-29

## 2024-03-28 RX ORDER — ALPRAZOLAM 0.5 MG/1
1 TABLET ORAL 3 TIMES DAILY PRN
Status: DISCONTINUED | OUTPATIENT
Start: 2024-03-28 | End: 2024-03-28

## 2024-03-28 RX ORDER — ACETAMINOPHEN 325 MG/1
650 TABLET ORAL EVERY 6 HOURS PRN
Status: DISCONTINUED | OUTPATIENT
Start: 2024-03-28 | End: 2024-03-30 | Stop reason: HOSPADM

## 2024-03-28 RX ORDER — HEPARIN SODIUM 5000 [USP'U]/ML
5000 INJECTION, SOLUTION INTRAVENOUS; SUBCUTANEOUS EVERY 8 HOURS SCHEDULED
Status: DISCONTINUED | OUTPATIENT
Start: 2024-03-28 | End: 2024-03-30 | Stop reason: HOSPADM

## 2024-03-28 RX ORDER — OXYCODONE HYDROCHLORIDE 10 MG/1
20 TABLET ORAL EVERY 6 HOURS PRN
Status: DISCONTINUED | OUTPATIENT
Start: 2024-03-29 | End: 2024-03-29

## 2024-03-28 RX ORDER — CEFTRIAXONE 2 G/50ML
2000 INJECTION, SOLUTION INTRAVENOUS ONCE
Status: COMPLETED | OUTPATIENT
Start: 2024-03-28 | End: 2024-03-28

## 2024-03-28 RX ORDER — ONDANSETRON 2 MG/ML
4 INJECTION INTRAMUSCULAR; INTRAVENOUS EVERY 6 HOURS PRN
Status: DISCONTINUED | OUTPATIENT
Start: 2024-03-28 | End: 2024-03-30 | Stop reason: HOSPADM

## 2024-03-28 RX ORDER — INSULIN LISPRO 100 [IU]/ML
1-5 INJECTION, SOLUTION INTRAVENOUS; SUBCUTANEOUS
Status: DISCONTINUED | OUTPATIENT
Start: 2024-03-28 | End: 2024-03-30 | Stop reason: HOSPADM

## 2024-03-28 RX ORDER — ALPRAZOLAM 0.5 MG/1
2 TABLET ORAL
Status: DISCONTINUED | OUTPATIENT
Start: 2024-03-28 | End: 2024-03-29

## 2024-03-28 RX ORDER — SODIUM CHLORIDE, SODIUM LACTATE, POTASSIUM CHLORIDE, CALCIUM CHLORIDE 600; 310; 30; 20 MG/100ML; MG/100ML; MG/100ML; MG/100ML
75 INJECTION, SOLUTION INTRAVENOUS CONTINUOUS
Status: DISCONTINUED | OUTPATIENT
Start: 2024-03-28 | End: 2024-03-29

## 2024-03-28 RX ORDER — INSULIN LISPRO 100 [IU]/ML
1-6 INJECTION, SOLUTION INTRAVENOUS; SUBCUTANEOUS
Status: DISCONTINUED | OUTPATIENT
Start: 2024-03-28 | End: 2024-03-30 | Stop reason: HOSPADM

## 2024-03-28 RX ADMIN — CEFTRIAXONE 2000 MG: 2 INJECTION, SOLUTION INTRAVENOUS at 16:13

## 2024-03-28 RX ADMIN — HEPARIN SODIUM 5000 UNITS: 5000 INJECTION INTRAVENOUS; SUBCUTANEOUS at 22:29

## 2024-03-28 RX ADMIN — IOHEXOL 100 ML: 350 INJECTION, SOLUTION INTRAVENOUS at 18:36

## 2024-03-28 RX ADMIN — SODIUM CHLORIDE, SODIUM LACTATE, POTASSIUM CHLORIDE, AND CALCIUM CHLORIDE 75 ML/HR: .6; .31; .03; .02 INJECTION, SOLUTION INTRAVENOUS at 18:17

## 2024-03-28 RX ADMIN — OXYCODONE HYDROCHLORIDE 10 MG: 10 TABLET ORAL at 21:37

## 2024-03-28 RX ADMIN — INSULIN LISPRO 2 UNITS: 100 INJECTION, SOLUTION INTRAVENOUS; SUBCUTANEOUS at 21:41

## 2024-03-28 RX ADMIN — ACETAMINOPHEN 650 MG: 325 TABLET, FILM COATED ORAL at 21:37

## 2024-03-28 RX ADMIN — INSULIN LISPRO 2 UNITS: 100 INJECTION, SOLUTION INTRAVENOUS; SUBCUTANEOUS at 18:20

## 2024-03-28 RX ADMIN — SODIUM CHLORIDE 1000 ML: 0.9 INJECTION, SOLUTION INTRAVENOUS at 14:54

## 2024-03-28 RX ADMIN — ALPRAZOLAM 2 MG: 0.5 TABLET ORAL at 21:37

## 2024-03-28 NOTE — ASSESSMENT & PLAN NOTE
"Lab Results   Component Value Date    HGBA1C 7.8 (H) 01/17/2024       No results for input(s): \"POCGLU\" in the last 72 hours.    Blood Sugar Average: Last 72 hrs:  Carb controlled diet  Sliding scale insulin    "

## 2024-03-28 NOTE — ASSESSMENT & PLAN NOTE
Possibly complicated from prostatic biopsy for elevated PSA levels  Patient denies any deep-seated prostatic pain  Treated with IV ceftriaxone and change antibiotic for speciation and sensitivity

## 2024-03-28 NOTE — ASSESSMENT & PLAN NOTE
PDMP checked  Patient chronically takes 50 to 60 mg of oxycodone twice a day  Will keep him on oxycodone 20 mg every 4 hours as needed for now  Monitor for any signs of toxicity

## 2024-03-28 NOTE — ED PROVIDER NOTES
History  Chief Complaint   Patient presents with    Post-op Problem     Prostate biopsy 2 days ago, now febrile, sent by urology, took tylenol pta      62yo male with a history of type 2 diabetes and chronic back pain on opioids presenting for evaluation of a fever. Patient had a prostate biopsy 2 days ago for an elevated PSA. He has been feeling unwell since have the procedure done. He started with shaking chills and fevers yesterday. Tmax 101. He called the urology office today and he was advised to go to the ED for evaluation. Patient reports minor rectal discomfort with rectal bleeding earlier today. No vomiting.         History provided by:  Patient and medical records   used: No        Prior to Admission Medications   Prescriptions Last Dose Informant Patient Reported? Taking?   ALPRAZolam (XANAX) 1 mg tablet  Self Yes No   Sig: take 1 tablet by mouth every morning and 2 tablets by mouth AT NIGHT if needed   Boswellia-Glucosamine-Vit D (OSTEO BI-FLEX ONE PER DAY PO)  Self Yes No   Sig: Take by mouth   Calcium-Vitamin D-Vitamin K (CALCIUM + D + K PO)  Self Yes No   Sig: Take 2,400 mg by mouth   VITAMIN D PO  Self Yes No   Sig: Take by mouth   ibuprofen (MOTRIN) 600 mg tablet  Self Yes No   Sig: Take 600 mg by mouth every 6 (six) hours as needed   Patient not taking: Reported on 3/26/2024   metFORMIN (GLUCOPHAGE-XR) 500 mg 24 hr tablet  Self No No   Sig: Take 2 tablets (1,000 mg total) by mouth daily with dinner   omeprazole (PriLOSEC) 20 mg delayed release capsule  Self Yes No   Sig: Take by mouth daily   oxyCODONE (ROXICODONE) 10 MG TABS  Self Yes No   Sig: take 2 tablets by mouth every 6 hours if needed for MODERATE TO S...  (REFER TO PRESCRIPTION NOTES).   oxyCODONE (ROXICODONE) 15 mg immediate release tablet  Self Yes No   Sig: Take 70 mg by mouth every 12 (twelve) hours as needed    Patient not taking: Reported on 3/26/2024   oxyCODONE (ROXICODONE) 5 immediate release tablet  Self Yes  No   Sig: take 1 tablet by mouth every 4 hours if needed for MODERATE TO SEVERE PAIN   sitaGLIPtin (JANUVIA) 50 mg tablet  Self No No   Sig: Take 1 tablet (50 mg total) by mouth daily      Facility-Administered Medications: None       Past Medical History:   Diagnosis Date    Chronic pain disorder     neck and back    Colon polyp     Cough     Diabetes (HCC)     Diabetes mellitus (HCC)     Hyperlipidemia     Scoliosis        Past Surgical History:   Procedure Laterality Date    TOOTH EXTRACTION  06/2021       Family History   Problem Relation Age of Onset    Hypertension Mother     Hyperlipidemia Mother     Prostate cancer Father     Heart disease Father     Stroke Father     Diabetes Father     Cancer Father      I have reviewed and agree with the history as documented.    E-Cigarette/Vaping    E-Cigarette Use Never User      E-Cigarette/Vaping Substances    Nicotine No     THC No     CBD No     Flavoring No     Other No     Unknown No      Social History     Tobacco Use    Smoking status: Never    Smokeless tobacco: Never   Vaping Use    Vaping status: Never Used   Substance Use Topics    Alcohol use: Not Currently     Comment: rarely    Drug use: Never       Review of Systems   Constitutional:  Positive for chills, fatigue and fever.   HENT:  Negative for drooling and voice change.    Eyes:  Negative for discharge and redness.   Respiratory:  Negative for shortness of breath and stridor.    Cardiovascular:  Negative for chest pain and leg swelling.   Gastrointestinal:  Positive for anal bleeding. Negative for abdominal pain and vomiting.   Genitourinary:  Negative for difficulty urinating.   Musculoskeletal:  Negative for neck pain and neck stiffness.   Skin:  Negative for color change and rash.   Neurological:  Negative for seizures and syncope.   Psychiatric/Behavioral:  Negative for confusion. The patient is not nervous/anxious.    All other systems reviewed and are negative.      Physical Exam  Physical  Exam  Vitals and nursing note reviewed.   Constitutional:       General: He is not in acute distress.     Appearance: He is well-developed. He is not diaphoretic.   HENT:      Head: Normocephalic and atraumatic.      Right Ear: External ear normal.      Left Ear: External ear normal.   Eyes:      General: No scleral icterus.        Right eye: No discharge.         Left eye: No discharge.      Conjunctiva/sclera: Conjunctivae normal.   Cardiovascular:      Rate and Rhythm: Normal rate and regular rhythm.      Heart sounds: Normal heart sounds. No murmur heard.  Pulmonary:      Effort: Pulmonary effort is normal. No respiratory distress.      Breath sounds: Normal breath sounds. No stridor. No wheezing or rales.   Abdominal:      General: Bowel sounds are normal. There is no distension.      Palpations: Abdomen is soft.      Tenderness: There is no abdominal tenderness. There is no guarding.   Musculoskeletal:         General: No deformity. Normal range of motion.      Cervical back: Normal range of motion and neck supple.   Skin:     General: Skin is warm and dry.   Neurological:      Mental Status: He is alert. He is not disoriented.      GCS: GCS eye subscore is 4. GCS verbal subscore is 5. GCS motor subscore is 6.   Psychiatric:         Behavior: Behavior normal.         Vital Signs  ED Triage Vitals [03/28/24 1356]   Temperature Pulse Respirations Blood Pressure SpO2   97.6 °F (36.4 °C) 88 20 119/75 97 %      Temp Source Heart Rate Source Patient Position - Orthostatic VS BP Location FiO2 (%)   Temporal Monitor Sitting Left arm --      Pain Score       --           Vitals:    03/28/24 1356 03/28/24 1537   BP: 119/75 124/75   Pulse: 88 98   Patient Position - Orthostatic VS: Sitting Sitting         Visual Acuity      ED Medications  Medications   cefTRIAXone (ROCEPHIN) IVPB (premix in dextrose) 2,000 mg 50 mL (2,000 mg Intravenous New Bag 3/28/24 1613)   sodium chloride 0.9 % bolus 1,000 mL (1,000 mL Intravenous  New Bag 3/28/24 1454)       Diagnostic Studies  Results Reviewed       Procedure Component Value Units Date/Time    Procalcitonin [[id number]]  (Normal) Collected: 03/28/24 1453    Lab Status: Final result Specimen: Blood from Arm, Right Updated: 03/28/24 1540     Procalcitonin 0.09 ng/ml     Urine Microscopic [[id number]]  (Abnormal) Collected: 03/28/24 1453    Lab Status: Final result Specimen: Urine, Clean Catch Updated: 03/28/24 1532     RBC, UA 4-10 /hpf      WBC, UA 10-20 /hpf      Epithelial Cells None Seen /hpf      Bacteria, UA Innumerable /hpf      MUCUS THREADS Occasional    Urine culture [[id number]] Collected: 03/28/24 1453    Lab Status: In process Specimen: Urine, Clean Catch Updated: 03/28/24 1532    Comprehensive metabolic panel [[id number]]  (Abnormal) Collected: 03/28/24 1453    Lab Status: Final result Specimen: Blood from Arm, Right Updated: 03/28/24 1530     Sodium 136 mmol/L      Potassium 4.1 mmol/L      Chloride 102 mmol/L      CO2 26 mmol/L      ANION GAP 8 mmol/L      BUN 13 mg/dL      Creatinine 0.83 mg/dL      Glucose 282 mg/dL      Calcium 10.2 mg/dL      AST 18 U/L      ALT 16 U/L      Alkaline Phosphatase 57 U/L      Total Protein 7.8 g/dL      Albumin 4.4 g/dL      Total Bilirubin 0.81 mg/dL      eGFR 93 ml/min/1.73sq m     Narrative:      National Kid[employer] guidelines for Chronic Kidney Disease (CKD):     Stage 1 with normal or high GFR (GFR > 90 mL/min/1.73 square meters)    Stage 2 Mild CKD (GFR = 60-89 mL/min/1.73 square meters)    Stage 3A Moderate CKD (GFR = 45-59 mL/min/1.73 square meters)    Stage 3B Moderate CKD (GFR = 30-44 mL/min/1.73 square meters)    Stage 4 Severe CKD (GFR = 15-29 mL/min/1.73 square meters)    Stage 5 End Stage CKD (GFR <15 mL/min/1.73 square meters)  Note: GFR calculation is accurate only with a steady state creatinine    Lactic acid [[id number]]  (Normal) Collected: 03/28/24 1453    Lab Status: Final result Specimen: Blood from Arm, Right  Updated: 03/28/24 1529     LACTIC ACID 1.8 mmol/L     Narrative:      Result may be elevated if tourniquet was used during collection.    Protime-INR [[id number]]  (Normal) Collected: 03/28/24 1453    Lab Status: Final result Specimen: Blood from Arm, Right Updated: 03/28/24 1527     Protime 14.4 seconds      INR 1.06    APTT [[id number]]  (Normal) Collected: 03/28/24 1453    Lab Status: Final result Specimen: Blood from Arm, Right Updated: 03/28/24 1527     PTT 28 seconds     UA w Reflex to Microscopic w Reflex to Culture [[id number]]  (Abnormal) Collected: 03/28/24 1453    Lab Status: Final result Specimen: Urine, Clean Catch Updated: 03/28/24 1527     Color, UA Light Yellow     Clarity, UA Clear     Specific Gravity, UA 1.032     pH, UA 5.0     Leukocytes, UA Elevated glucose may cause decreased leukocyte values. See urine microscopic for UWBC result     Nitrite, UA Positive     Protein, UA Negative mg/dl      Glucose, UA >=1000 (1%) mg/dl      Ketones, UA Negative mg/dl      Urobilinogen, UA <2.0 mg/dl      Bilirubin, UA Negative     Occult Blood, UA Small    CBC and differential [[id number]]  (Abnormal) Collected: 03/28/24 1453    Lab Status: Final result Specimen: Blood from Arm, Right Updated: 03/28/24 1515     WBC 14.50 Thousand/uL      RBC 4.46 Million/uL      Hemoglobin 14.0 g/dL      Hematocrit 38.6 %      MCV 87 fL      MCH 31.4 pg      MCHC 36.3 g/dL      RDW 12.0 %      MPV 8.8 fL      Platelets 153 Thousands/uL      nRBC 0 /100 WBCs      Neutrophils Relative 76 %      Immature Grans % 0 %      Lymphocytes Relative 14 %      Monocytes Relative 10 %      Eosinophils Relative 0 %      Basophils Relative 0 %      Neutrophils Absolute 10.95 Thousands/µL      Absolute Immature Grans 0.04 Thousand/uL      Absolute Lymphocytes 2.08 Thousands/µL      Absolute Monocytes 1.38 Thousand/µL      Eosinophils Absolute 0.02 Thousand/µL      Basophils Absolute 0.03 Thousands/µL     Blood culture #1 [[id number]] Collected:  03/28/24 1508    Lab Status: In process Specimen: Blood from Arm, Left Updated: 03/28/24 1514    Blood culture #2 [[id number]] Collected: 03/28/24 1453    Lab Status: In process Specimen: Blood from Arm, Right Updated: 03/28/24 1507                   No orders to display              Procedures  Procedures         ED Course                               SBIRT 20yo+      Flowsheet Row Most Recent Value   Initial Alcohol Screen: US AUDIT-C     1. How often do you have a drink containing alcohol? 0 Filed at: 03/28/2024 4839   2. How many drinks containing alcohol do you have on a typical day you are drinking?  0 Filed at: 03/28/2024 4796   3a. Male UNDER 65: How often do you have five or more drinks on one occasion? 0 Filed at: 03/28/2024 4285   3b. FEMALE Any Age, or MALE 65+: How often do you have 4 or more drinks on one occassion? 0 Filed at: 03/28/2024 3055   Audit-C Score 0 Filed at: 03/28/2024 0920   ALESIA: How many times in the past year have you...    Used an illegal drug or used a prescription medication for non-medical reasons? Never Filed at: 03/28/2024 0629                      Medical Decision Making  63yoM here with fever and chills s/p prostate biopsy 2 days ago. Tmax 101. He is afebrile here although had Tylenol prior to arrival. Remainder of vitals stable. He is non-toxic appearing. Abdominal exam is benign.    Initial ED plan: Check septic workup and UA. IV fluid bolus.    Final assessment: Labs reveal a leukocytosis with a WBC of 14. Lactate is normal. UA is nitrite positive with innumerable WBC. Case discussed with urology team who recommends admission for IV abx. He was given IV Rocephin and admitted for further management.      Problems Addressed:  Sepsis (HCC): acute illness or injury  Urinary tract infection: acute illness or injury    Amount and/or Complexity of Data Reviewed  External Data Reviewed: notes.  Labs: ordered.    Risk  Prescription drug management.  Decision regarding  hospitalization.             Disposition  Final diagnoses:   Sepsis (HCC)   Urinary tract infection     Time reflects when diagnosis was documented in both MDM as applicable and the Disposition within this note       Time User Action Codes Description Comment    3/28/2024  4:23 PM Jazmyne Hansen Add [A41.9] Sepsis (HCC)     3/28/2024  4:23 PM Jazmyne Hansen Add [N39.0] Urinary tract infection           ED Disposition       ED Disposition   Admit    Condition   Stable    Date/Time   Thu Mar 28, 2024 8169    Comment   Case was discussed with Dr. Pacheco and the patient's admission status was agreed to be Admission Status: inpatient status to the service of Dr. Pacheco .               Follow-up Information    None         Patient's Medications   Discharge Prescriptions    No medications on file       No discharge procedures on file.    PDMP Review         Value Time User    PDMP Reviewed  Yes 4/18/2023  1:59 PM Helen Allen DO            ED Provider  Electronically Signed by             Jazmyne Hansen PA-C  03/28/24 2015

## 2024-03-28 NOTE — ASSESSMENT & PLAN NOTE
SIRS criteria met with fever, tachycardia, leukocytosis and infectious source would be urine  Does not meet severe sepsis criteria right now.  Lactic and procalcitonin normal  Status post 2 g IV ceftriaxone in the ER.  Plan to continue every 24 hours  Follow-up on blood and urine cultures  Continue IV fluids normal saline at 75 cc/h.  Status post 1 L bolus in the ER

## 2024-03-28 NOTE — TELEPHONE ENCOUNTER
Called pt and advised of recommendation to go to ER. Pt states understanding and will head over to ER

## 2024-03-28 NOTE — H&P
"Replaced by [facility]  H&P  Name: Bar Amador 63 y.o. male I MRN: [id number]  Unit/Bed#: -01 I Date of Admission: 3/28/2024   Date of Service: 3/28/2024 I Hospital Day: 0    Addendum: Patient on LR and normal saline  Assessment/Plan   * Sepsis without acute organ dysfunction (HCC)  Assessment & Plan  SIRS criteria met with tachycardia, leukocytosis and infectious source would be urine  Does not meet severe sepsis criteria right now.  Lactic and procalcitonin normal  Status post 2 g IV ceftriaxone in the ER.  Plan to continue every 24 hours  Follow-up on blood and urine cultures  Continue IV fluids normal saline at 75 cc/h.  Status post 1 L bolus in the ER    UTI (urinary tract infection)  Assessment & Plan  Possibly complicated from prostatic biopsy for elevated PSA levels  Patient denies any deep-seated prostatic pain  Treated with IV ceftriaxone and change antibiotic for speciation and sensitivity    GIB (gastrointestinal bleeding)  Assessment & Plan  Acute onset.  1 episode this a.m.  Hemoglobin stable.  Bright red blood which was painless  Will obtain CT abdomen pelvis with contrast in view of recent prostatic biopsy  Check hemoglobin in a.m. and monitor for any repeat GI bleed episodes  Will continue with subcutaneous heparin for DVT prophylaxis at this time as there is no active bleeding and hemoglobin continues to be stable.  If patient has repeat episode of GI bleed, will discontinue heparin product    H/O prostate biopsy  Assessment & Plan  Recommend follow-up results with urology as outpatient    Type 2 diabetes mellitus with diabetic polyneuropathy, without long-term current use of insulin (HCC)  Assessment & Plan  Lab Results   Component Value Date    HGBA1C 7.8 (H) 01/17/2024       No results for input(s): \"POCGLU\" in the last 72 hours.    Blood Sugar Average: Last 72 hrs:  Carb controlled diet  Sliding scale insulin      Continuous opioid dependence (HCC)  Assessment & " Plan  PDMP checked  Patient chronically takes 50 to 60 mg of oxycodone twice a day  Will keep him on oxycodone 20 mg every 4 hours as needed for now  Monitor for any signs of toxicity       VTE Pharmacologic Prophylaxis:    Heparin  Code Status: Level 1 - Full Code   Discussion with family:  No family at bedside.  A call will be made tomorrow.     Anticipated Length of Stay: Patient will be admitted on an inpatient basis with an anticipated length of stay of greater than 2 midnights secondary to sepsis likely secondary to UTI and GI bleed which will require further workup and IV medications and close monitoring.    Total Time Spent on Date of Encounter in care of patient: 75 mins. This time was spent on one or more of the following: performing physical exam; counseling and coordination of care; obtaining or reviewing history; documenting in the medical record; reviewing/ordering tests, medications or procedures; communicating with other healthcare professionals and discussing with patient's family/caregivers.    Chief Complaint: Blood in stool, fever at home, chills    History of Present Illness:  Bar Amador is a 63 y.o. male with a PMH of elevated PSA levels status post prostate biopsy 2 days ago, diabetes mellitus type 2, chronic opiate dependence, spinal stenosis with severe pain on chronic opiate who presents with fever at home, measured 101, chills and fatigue that started this morning.  Patient also had an episode of bright red blood in the toilet bowl this morning.  This was painless and not associated with abdominal pain.  Patient is status post prostate biopsy 2 days ago.  He denies any dysuria, CVA angle pain, suprapubic pain but does say that his urine is very dark-colored and foul-smelling.    Review of Systems:  Review of Systems   Constitutional:  Positive for chills, fatigue and fever.   HENT:  Negative for ear pain and sore throat.    Eyes:  Negative for pain and visual disturbance.    Respiratory:  Negative for cough and shortness of breath.    Cardiovascular:  Negative for chest pain and palpitations.   Gastrointestinal:  Positive for blood in stool. Negative for abdominal pain and vomiting.   Genitourinary:  Negative for dysuria and hematuria.        Foul-smelling and dark urine   Musculoskeletal:  Negative for arthralgias and back pain.   Skin:  Negative for color change and rash.   Neurological:  Negative for seizures and syncope.   Psychiatric/Behavioral:  Negative for agitation, behavioral problems and confusion.    All other systems reviewed and are negative.      Past Medical and Surgical History:   Past Medical History:   Diagnosis Date    Chronic pain disorder     neck and back    Colon polyp     Cough     Diabetes (HCC)     Diabetes mellitus (HCC)     Hyperlipidemia     Scoliosis        Past Surgical History:   Procedure Laterality Date    TOOTH EXTRACTION  06/2021       Meds/Allergies:  Prior to Admission medications    Medication Sig Start Date End Date Taking? Authorizing Provider   ALPRAZolam (XANAX) 1 mg tablet take 1 tablet by mouth every morning and 2 tablets by mouth AT NIGHT if needed 11/8/21   Historical Provider, MD   Boswellia-Glucosamine-Vit D (OSTEO BI-FLEX ONE PER DAY PO) Take by mouth    Historical Provider, MD   Calcium-Vitamin D-Vitamin K (CALCIUM + D + K PO) Take 2,400 mg by mouth    Historical Provider, MD   ibuprofen (MOTRIN) 600 mg tablet Take 600 mg by mouth every 6 (six) hours as needed  Patient not taking: Reported on 3/26/2024 12/5/23   Historical Provider, MD   metFORMIN (GLUCOPHAGE-XR) 500 mg 24 hr tablet Take 2 tablets (1,000 mg total) by mouth daily with dinner 1/12/24   Helen Allen DO   omeprazole (PriLOSEC) 20 mg delayed release capsule Take by mouth daily    Historical Provider, MD   oxyCODONE (ROXICODONE) 10 MG TABS take 2 tablets by mouth every 6 hours if needed for MODERATE TO S...  (REFER TO PRESCRIPTION NOTES). 1/22/24   Historical Provider,  MD   oxyCODONE (ROXICODONE) 15 mg immediate release tablet Take 70 mg by mouth every 12 (twelve) hours as needed   Patient not taking: Reported on 3/26/2024 5/12/21   Historical Provider, MD   oxyCODONE (ROXICODONE) 5 immediate release tablet take 1 tablet by mouth every 4 hours if needed for MODERATE TO SEVERE PAIN 1/22/24   Historical Provider, MD   sitaGLIPtin (JANUVIA) 50 mg tablet Take 1 tablet (50 mg total) by mouth daily 3/25/24   Helen Allen DO   VITAMIN D PO Take by mouth    Historical Provider, MD     I have reviewed home medications with patient personally.    Allergies:   Allergies   Allergen Reactions    Penicillins Hives     rash and hives      Prednisone Dizziness     myalgia         Social History:  Marital Status: /Civil Union     Substance Use History:   Social History     Substance and Sexual Activity   Alcohol Use Not Currently    Comment: rarely     Social History     Tobacco Use   Smoking Status Never   Smokeless Tobacco Never     Social History     Substance and Sexual Activity   Drug Use Never       Family History:  Family History   Problem Relation Age of Onset    Hypertension Mother     Hyperlipidemia Mother     Prostate cancer Father     Heart disease Father     Stroke Father     Diabetes Father     Cancer Father        Physical Exam:     Vitals:   Blood Pressure: 138/79 (03/28/24 1936)  Pulse: 104 (03/28/24 1936)  Temperature: (!) 101.4 °F (38.6 °C) (03/28/24 1936)  Temp Source: Oral (03/28/24 1611)  Respirations: 18 (03/28/24 1537)  SpO2: 93 % (03/28/24 1936)    Physical Exam General- Awake, alert and oriented x 3, looks comfortable  HEENT- Normocephalic, atraumatic, oral mucosa- moist  Neck- Supple, No carotid bruit, no JVD  CVS- Normal S1/ S2, Regular rate and rhythm, No murmur, No edema  Respiratory system- B/L clear breath sounds, no wheezing  Abdomen- Soft, Non distended, no tenderness, Bowel sound- present 4 quads  Genitourinary- No suprapubic tenderness, No CVA  tenderness  Skin- No new bruise or rash  Musculoskeletal- No gross deformity  Psych- No acute psychosis  CNS- CN II- XII grossly intact, No acute focal neurologic deficit noted      Additional Data:     Lab Results:  Results from last 7 days   Lab Units 03/28/24  1453   WBC Thousand/uL 14.50*   HEMOGLOBIN g/dL 14.0   HEMATOCRIT % 38.6   PLATELETS Thousands/uL 153   NEUTROS PCT % 76*   LYMPHS PCT % 14   MONOS PCT % 10   EOS PCT % 0     Results from last 7 days   Lab Units 03/28/24  1453   SODIUM mmol/L 136   POTASSIUM mmol/L 4.1   CHLORIDE mmol/L 102   CO2 mmol/L 26   BUN mg/dL 13   CREATININE mg/dL 0.83   ANION GAP mmol/L 8   CALCIUM mg/dL 10.2   ALBUMIN g/dL 4.4   TOTAL BILIRUBIN mg/dL 0.81   ALK PHOS U/L 57   ALT U/L 16   AST U/L 18   GLUCOSE RANDOM mg/dL 282*     Results from last 7 days   Lab Units 03/28/24  1453   INR  1.06     Results from last 7 days   Lab Units 03/28/24  1815   POC GLUCOSE mg/dl 225*         Results from last 7 days   Lab Units 03/28/24  1453   LACTIC ACID mmol/L 1.8   PROCALCITONIN ng/ml 0.09       Lines/Drains:  Invasive Devices       Peripheral Intravenous Line  Duration             Peripheral IV 03/28/24 Proximal;Right;Ventral (anterior) Forearm <1 day                        Imaging: No pertinent imaging reviewed.  CT abdomen pelvis with contrast is pending  CT abdomen pelvis w contrast    (Results Pending)       EKG and Other Studies Reviewed on Admission:   EKG: No EKG obtained.  Will order once for baseline    ** Please Note: This note has been constructed using a voice recognition system. **

## 2024-03-28 NOTE — TELEPHONE ENCOUNTER
"Answer Assessment - Initial Assessment Questions  1. REASON FOR CALL or QUESTION: \"What is your reason for calling today?\" or \"How can I best help you?\" or \"What question do you have that I can help answer?\"          Patient of Dr. Longoria, seen 3/26/24 for Prostate Bx    Patient calling in with c/o of fever 101.0. States breaking out in sweats.    Denies pain or n/v. He states some bleeding but minimal.    Discussed to go to ED, patient reluctant due to his Insurance and possible cost to him.    Did discuss concerns for infection after prostate bx. Advised patient to take tylenol for the fever. Informed will send message to provider and call back with recommendations.    Please advise    # [phone]    Protocols used: Information Only Call - No Triage-ADULT-[place]    "

## 2024-03-28 NOTE — SEPSIS NOTE
Sepsis Note   Bar Amador 63 y.o. male MRN: [id number]  Unit/Bed#: Z1H3 Encounter: [id number]              There is no height or weight on file to calculate BMI.  Wt Readings from Last 1 Encounters:   03/26/24 96.6 kg (213 lb)        Ideal body weight: 79.9 kg (176 lb 2.4 oz)  Adjusted ideal body weight: 86.6 kg (190 lb 14.2 oz)    Noted to be meeting SIRS criteria with elevated wbc and tachycardia >90 along with abnormal UA  Lactic and procal reviewed - wnl  Started on antibiotics in the ER  Blood and urine cultures have been ordered  S/p 1L NS bolus ordered in ER

## 2024-03-28 NOTE — ASSESSMENT & PLAN NOTE
Acute onset.  1 episode this a.m.  Hemoglobin stable.  Bright red blood which was painless  Will obtain CT abdomen pelvis with contrast in view of recent prostatic biopsy  Check hemoglobin in a.m. and monitor for any repeat GI bleed episodes  Will continue with subcutaneous heparin for DVT prophylaxis at this time as there is no active bleeding and hemoglobin continues to be stable.  If patient has repeat episode of GI bleed, will discontinue heparin product

## 2024-03-29 LAB
ANION GAP SERPL CALCULATED.3IONS-SCNC: 8 MMOL/L (ref 4–13)
BASOPHILS # BLD AUTO: 0.02 THOUSANDS/ÂΜL (ref 0–0.1)
BASOPHILS NFR BLD AUTO: 0 % (ref 0–1)
BUN SERPL-MCNC: 11 MG/DL (ref 5–25)
CALCIUM SERPL-MCNC: 9.9 MG/DL (ref 8.4–10.2)
CHLORIDE SERPL-SCNC: 102 MMOL/L (ref 96–108)
CO2 SERPL-SCNC: 27 MMOL/L (ref 21–32)
CREAT SERPL-MCNC: 0.71 MG/DL (ref 0.6–1.3)
EOSINOPHIL # BLD AUTO: 0.09 THOUSAND/ÂΜL (ref 0–0.61)
EOSINOPHIL NFR BLD AUTO: 1 % (ref 0–6)
ERYTHROCYTE [DISTWIDTH] IN BLOOD BY AUTOMATED COUNT: 12.1 % (ref 11.6–15.1)
GFR SERPL CREATININE-BSD FRML MDRD: 99 ML/MIN/1.73SQ M
GLUCOSE SERPL-MCNC: 184 MG/DL (ref 65–140)
GLUCOSE SERPL-MCNC: 223 MG/DL (ref 65–140)
GLUCOSE SERPL-MCNC: 266 MG/DL (ref 65–140)
GLUCOSE SERPL-MCNC: 267 MG/DL (ref 65–140)
HCT VFR BLD AUTO: 37 % (ref 36.5–49.3)
HGB BLD-MCNC: 13.3 G/DL (ref 12–17)
IMM GRANULOCYTES # BLD AUTO: 0.05 THOUSAND/UL (ref 0–0.2)
IMM GRANULOCYTES NFR BLD AUTO: 0 % (ref 0–2)
LYMPHOCYTES # BLD AUTO: 1.56 THOUSANDS/ÂΜL (ref 0.6–4.47)
LYMPHOCYTES NFR BLD AUTO: 13 % (ref 14–44)
MAGNESIUM SERPL-MCNC: 1.8 MG/DL (ref 1.9–2.7)
MCH RBC QN AUTO: 31.2 PG (ref 26.8–34.3)
MCHC RBC AUTO-ENTMCNC: 35.9 G/DL (ref 31.4–37.4)
MCV RBC AUTO: 87 FL (ref 82–98)
MONOCYTES # BLD AUTO: 1.05 THOUSAND/ÂΜL (ref 0.17–1.22)
MONOCYTES NFR BLD AUTO: 8 % (ref 4–12)
NEUTROPHILS # BLD AUTO: 9.71 THOUSANDS/ÂΜL (ref 1.85–7.62)
NEUTS SEG NFR BLD AUTO: 78 % (ref 43–75)
NRBC BLD AUTO-RTO: 0 /100 WBCS
PLATELET # BLD AUTO: 132 THOUSANDS/UL (ref 149–390)
PMV BLD AUTO: 8.8 FL (ref 8.9–12.7)
POTASSIUM SERPL-SCNC: 3.7 MMOL/L (ref 3.5–5.3)
PROCALCITONIN SERPL-MCNC: 0.11 NG/ML
RBC # BLD AUTO: 4.26 MILLION/UL (ref 3.88–5.62)
SODIUM SERPL-SCNC: 137 MMOL/L (ref 135–147)
WBC # BLD AUTO: 12.48 THOUSAND/UL (ref 4.31–10.16)

## 2024-03-29 PROCEDURE — 84145 PROCALCITONIN (PCT): CPT | Performed by: FAMILY MEDICINE

## 2024-03-29 PROCEDURE — 80048 BASIC METABOLIC PNL TOTAL CA: CPT | Performed by: FAMILY MEDICINE

## 2024-03-29 PROCEDURE — 82948 REAGENT STRIP/BLOOD GLUCOSE: CPT

## 2024-03-29 PROCEDURE — 85025 COMPLETE CBC W/AUTO DIFF WBC: CPT | Performed by: FAMILY MEDICINE

## 2024-03-29 PROCEDURE — 99232 SBSQ HOSP IP/OBS MODERATE 35: CPT | Performed by: STUDENT IN AN ORGANIZED HEALTH CARE EDUCATION/TRAINING PROGRAM

## 2024-03-29 PROCEDURE — 83735 ASSAY OF MAGNESIUM: CPT | Performed by: FAMILY MEDICINE

## 2024-03-29 RX ORDER — OXYCODONE HYDROCHLORIDE 10 MG/1
10 TABLET ORAL EVERY 4 HOURS PRN
Status: DISCONTINUED | OUTPATIENT
Start: 2024-03-29 | End: 2024-03-29

## 2024-03-29 RX ORDER — CEFTRIAXONE 2 G/50ML
2000 INJECTION, SOLUTION INTRAVENOUS EVERY 24 HOURS
Status: DISCONTINUED | OUTPATIENT
Start: 2024-03-29 | End: 2024-03-30

## 2024-03-29 RX ORDER — ALPRAZOLAM 0.5 MG/1
1 TABLET ORAL
Status: DISCONTINUED | OUTPATIENT
Start: 2024-03-29 | End: 2024-03-30 | Stop reason: HOSPADM

## 2024-03-29 RX ORDER — DOCUSATE SODIUM 100 MG/1
100 CAPSULE, LIQUID FILLED ORAL 2 TIMES DAILY
Status: DISCONTINUED | OUTPATIENT
Start: 2024-03-29 | End: 2024-03-29

## 2024-03-29 RX ORDER — BISACODYL 5 MG/1
5 TABLET, DELAYED RELEASE ORAL
Status: DISCONTINUED | OUTPATIENT
Start: 2024-03-29 | End: 2024-03-30

## 2024-03-29 RX ORDER — OXYCODONE HYDROCHLORIDE 10 MG/1
60 TABLET ORAL 2 TIMES DAILY PRN
Status: DISCONTINUED | OUTPATIENT
Start: 2024-03-29 | End: 2024-03-30 | Stop reason: HOSPADM

## 2024-03-29 RX ORDER — OXYCODONE HYDROCHLORIDE 10 MG/1
60 TABLET ORAL 2 TIMES DAILY PRN
Status: DISCONTINUED | OUTPATIENT
Start: 2024-03-29 | End: 2024-03-29

## 2024-03-29 RX ORDER — AMOXICILLIN 250 MG
1 CAPSULE ORAL
Status: DISCONTINUED | OUTPATIENT
Start: 2024-03-29 | End: 2024-03-29

## 2024-03-29 RX ORDER — SODIUM CHLORIDE, SODIUM LACTATE, POTASSIUM CHLORIDE, CALCIUM CHLORIDE 600; 310; 30; 20 MG/100ML; MG/100ML; MG/100ML; MG/100ML
75 INJECTION, SOLUTION INTRAVENOUS CONTINUOUS
Status: DISCONTINUED | OUTPATIENT
Start: 2024-03-29 | End: 2024-03-30 | Stop reason: HOSPADM

## 2024-03-29 RX ADMIN — INSULIN LISPRO 3 UNITS: 100 INJECTION, SOLUTION INTRAVENOUS; SUBCUTANEOUS at 11:15

## 2024-03-29 RX ADMIN — INSULIN LISPRO 1 UNITS: 100 INJECTION, SOLUTION INTRAVENOUS; SUBCUTANEOUS at 08:10

## 2024-03-29 RX ADMIN — INSULIN LISPRO 3 UNITS: 100 INJECTION, SOLUTION INTRAVENOUS; SUBCUTANEOUS at 17:30

## 2024-03-29 RX ADMIN — BISACODYL 5 MG: 5 TABLET, COATED ORAL at 22:31

## 2024-03-29 RX ADMIN — ALPRAZOLAM 1 MG: 0.5 TABLET ORAL at 23:35

## 2024-03-29 RX ADMIN — SODIUM CHLORIDE, SODIUM LACTATE, POTASSIUM CHLORIDE, AND CALCIUM CHLORIDE 75 ML/HR: .6; .31; .03; .02 INJECTION, SOLUTION INTRAVENOUS at 05:48

## 2024-03-29 RX ADMIN — OXYCODONE HYDROCHLORIDE 60 MG: 10 TABLET ORAL at 22:36

## 2024-03-29 RX ADMIN — CEFTRIAXONE 2000 MG: 2 INJECTION, SOLUTION INTRAVENOUS at 11:10

## 2024-03-29 RX ADMIN — INSULIN LISPRO 2 UNITS: 100 INJECTION, SOLUTION INTRAVENOUS; SUBCUTANEOUS at 22:30

## 2024-03-29 RX ADMIN — OXYCODONE HYDROCHLORIDE 60 MG: 10 TABLET ORAL at 10:58

## 2024-03-29 RX ADMIN — OXYCODONE HYDROCHLORIDE 20 MG: 10 TABLET ORAL at 02:52

## 2024-03-29 NOTE — ASSESSMENT & PLAN NOTE
PDMP checked  Patient chronically takes 50 to 60 mg of oxycodone twice a day prn  Reviewed with pharmacy, continue home regimen   Monitor for any signs of toxicity

## 2024-03-29 NOTE — ASSESSMENT & PLAN NOTE
Acute onset.  1 episode this a.m.  Hemoglobin stable.  Bright red blood which was painless. Denies any further episodes.   CT a/p: Minimal inflammation in the pelvis between the prostate and rectum without an abscess can be attributable to the recent biopsy.  No active signs of bleeding  Will continue with subcutaneous heparin for DVT prophylaxis at this time as there is no active bleeding and hemoglobin continues to be stable.  If patient has repeat episode of GI bleed, will discontinue heparin product  Monitor with am cbc

## 2024-03-29 NOTE — ASSESSMENT & PLAN NOTE
Possibly complicated from prostatic biopsy for elevated PSA levels  Patient denies any deep prostatic pain  Treated with IV ceftriaxone and change antibiotic for speciation and sensitivity  F/u on urine culture

## 2024-03-29 NOTE — UTILIZATION REVIEW
Initial Clinical Review    Admission: Date/Time/Statement:   Admission Orders (From admission, onward)       Ordered        03/28/24 1623  INPATIENT ADMISSION  Once                          Orders Placed This Encounter   Procedures    INPATIENT ADMISSION     Standing Status:   Standing     Number of Occurrences:   1     Order Specific Question:   Level of Care     Answer:   Med Surg [16]     Order Specific Question:   Estimated length of stay     Answer:   More than 2 Midnights     Order Specific Question:   Certification     Answer:   I certify that inpatient services are medically necessary for this patient for a duration of greater than two midnights. See H&P and MD Progress Notes for additional information about the patient's course of treatment.     ED Arrival Information       Expected   -    Arrival   3/28/2024 13:42    Acuity   Urgent              Means of arrival   Walk-In    Escorted by   Spouse    Service   Hospitalist    Admission type   Emergency              Arrival complaint   Fever post procedure             Chief Complaint   Patient presents with    Post-op Problem     Prostate biopsy 2 days ago, now febrile, sent by urology, took tylenol pta        Initial Presentation: 63 y.o. male to ED from home w/  PMH of elevated PSA levels status post prostate biopsy 2 days ago, diabetes mellitus type 2, chronic opiate dependence, spinal stenosis with severe pain on chronic opiate who presents with fever at home, measured 101, chills and fatigue that started this morning.  Patient also had an episode of bright red blood in the toilet bowl this morning.  This was painless and not associated with abdominal pain.  Patient is status post prostate biopsy 2 days ago. In ED found to have tachycardia , leukocytosis , hgb stable . Admitted IP status w/ sepsis , possible infectious source could be urine . S/p 2 g IV ceftriaxone and 1l bolus in ED . Plan to cont IV abx , f/u BC and Ua cx , IVF . GIB acute episode this  am . Obtain CT , check hgb in am . Cont sq heparin DVT ppx . DM SSI and monitor . Cont opiod dependence oxycodone BID and monitor for signs of toxicity .   PE : foul smelling dark urine .     Date:  3/29  Day 2: continue with subcutaneous heparin for DVT prophylaxis at this time as there is no active bleeding and hemoglobin continues to be stable. If patient has repeat episode of GI bleed, will discontinue heparin product . Cont IV ceftriaxone , f/u ua cx . Hgb 13.3 , cont to monitor . Temp this am 100.4, cont to monitor. Wbc dec to 12.48 from 15.0    ED Triage Vitals   Temperature Pulse Respirations Blood Pressure SpO2   03/28/24 1356 03/28/24 1356 03/28/24 1356 03/28/24 1356 03/28/24 1356   97.6 °F (36.4 °C) 88 20 119/75 97 %      Temp Source Heart Rate Source Patient Position - Orthostatic VS BP Location FiO2 (%)   03/28/24 1356 03/28/24 1356 03/28/24 1356 03/28/24 1356 --   Temporal Monitor Sitting Left arm       Pain Score       03/28/24 2007       6          Wt Readings from Last 1 Encounters:   03/26/24 96.6 kg (213 lb)     Additional Vital Signs:   03/29/24 06:44:25 100.4 °F (38 °C) 94 -- 115/70 85 95 % -- --   03/29/24 0322 -- -- -- -- -- 94 % -- --   03/28/24 23:35:19 99.5 °F (37.5 °C) 88 18 132/80 97 93 % -- --   03/28/24 2137 101.1 °F (38.4 °C) Abnormal  110 Abnormal  -- 135/80 98 -- -- --   03/28/24 21:34:58 101.1 °F (38.4 °C) Abnormal  98 -- 135/80 98 95 % -- --   03/28/24 2007 -- -- -- -- -- 94 % None (Room air) --   03/28/24 19:36:09 101.4 °F (38.6 °C) Abnormal  104 -- 138/79 99 93 % -- --   03/28/24 1611 98.4 °F (36.9 °C) -- -- -- -- -- -- --   03/28/24 1537 98.5 °F (36.9 °C) 98 18 124/75 -- 98 % None (Room air) Sitting     Pertinent Labs/Diagnostic Test Results:   CT abdomen pelvis w contrast   Final Result by Lyndon Spears MD (03/29 9341)   1.  Minimal inflammation in the pelvis between the prostate and rectum without an abscess can be attributable to the recent biopsy.   2.  No findings of  active bleeding in the gastrointestinal tract or source for patient's reported gastrointestinal bleeding.            Workstation performed: [id number]               Results from last 7 days   Lab Units 03/29/24  0508 03/28/24  1453   WBC Thousand/uL 12.48* 14.50*   HEMOGLOBIN g/dL 13.3 14.0   HEMATOCRIT % 37.0 38.6   PLATELETS Thousands/uL 132* 153   NEUTROS ABS Thousands/µL 9.71* 10.95*         Results from last 7 days   Lab Units 03/29/24  0508 03/28/24  1453   SODIUM mmol/L 137 136   POTASSIUM mmol/L 3.7 4.1   CHLORIDE mmol/L 102 102   CO2 mmol/L 27 26   ANION GAP mmol/L 8 8   BUN mg/dL 11 13   CREATININE mg/dL 0.71 0.83   EGFR ml/min/1.73sq m 99 93   CALCIUM mg/dL 9.9 10.2   MAGNESIUM mg/dL 1.8*  --      Results from last 7 days   Lab Units 03/28/24  1453   AST U/L 18   ALT U/L 16   ALK PHOS U/L 57   TOTAL PROTEIN g/dL 7.8   ALBUMIN g/dL 4.4   TOTAL BILIRUBIN mg/dL 0.81     Results from last 7 days   Lab Units 03/29/24  1051 03/28/24  2115 03/28/24  1815   POC GLUCOSE mg/dl 267* 240* 225*     Results from last 7 days   Lab Units 03/29/24  0508 03/28/24  1453   GLUCOSE RANDOM mg/dL 184* 282*       Results from last 7 days   Lab Units 03/28/24  1453   PROTIME seconds 14.4   INR  1.06   PTT seconds 28         Results from last 7 days   Lab Units 03/29/24  0508 03/28/24  1453   PROCALCITONIN ng/ml 0.11 0.09     Results from last 7 days   Lab Units 03/28/24  1453   LACTIC ACID mmol/L 1.8         Results from last 7 days   Lab Units 03/28/24  1453   CLARITY UA  Clear   COLOR UA  Light Yellow   SPEC GRAV UA  1.032*   PH UA  5.0   GLUCOSE UA mg/dl >=1000 (1%)*   KETONES UA mg/dl Negative   BLOOD UA  Small*   PROTEIN UA mg/dl Negative   NITRITE UA  Positive*   BILIRUBIN UA  Negative   UROBILINOGEN UA (BE) mg/dl <2.0   LEUKOCYTES UA  Elevated glucose may cause decreased leukocyte values. See urine microscopic for UWBC result*   WBC UA /hpf 10-20*   RBC UA /hpf 4-10*   BACTERIA UA /hpf Innumerable*   EPITHELIAL CELLS WET  PREP /hpf None Seen   MUCUS THREADS  Occasional*     Results from last 7 days   Lab Units 03/28/24  1508 03/28/24  1453   BLOOD CULTURE  Received in Microbiology Lab. Culture in Progress. Received in Microbiology Lab. Culture in Progress.       ED Treatment:   Medication Administration from 03/28/2024 1342 to 03/28/2024 1855         Date/Time Order Dose Route Action     03/28/2024 1454 EDT sodium chloride 0.9 % bolus 1,000 mL 1,000 mL Intravenous New Bag     03/28/2024 1613 EDT cefTRIAXone (ROCEPHIN) IVPB (premix in dextrose) 2,000 mg 50 mL 2,000 mg Intravenous New Bag     03/28/2024 1817 EDT lactated ringers infusion 75 mL/hr Intravenous New Bag     03/28/2024 1820 EDT insulin lispro (HumALOG/ADMELOG) 100 units/mL subcutaneous injection 1-6 Units 2 Units Subcutaneous Given          Past Medical History:   Diagnosis Date    Chronic pain disorder     neck and back    Colon polyp     Cough     Diabetes (HCC)     Diabetes mellitus (HCC)     Hyperlipidemia     Scoliosis      Present on Admission:   Sepsis without acute organ dysfunction (HCC)   Type 2 diabetes mellitus with diabetic polyneuropathy, without long-term current use of insulin (HCC)   UTI (urinary tract infection)   Continuous opioid dependence (HCC)      Admitting Diagnosis: Blood in stool [K92.1]  Urinary tract infection [N39.0]  Post-operative complication [T81.9XXA]  Sepsis (HCC) [A41.9]  Age/Sex: 63 y.o. male  Admission Orders:  Scheduled Medications:  cefTRIAXone, 2,000 mg, Intravenous, Q24H  heparin (porcine), 5,000 Units, Subcutaneous, Q8H KRISTEN  insulin lispro, 1-5 Units, Subcutaneous, HS  insulin lispro, 1-6 Units, Subcutaneous, TID AC  senna-docusate sodium, 1 tablet, Oral, HS      Continuous IV Infusions:  lactated ringers, 75 mL/hr, Intravenous, Continuous      PRN Meds:  acetaminophen, 650 mg, Oral, Q6H PRN  ALPRAZolam, 2 mg, Oral, HS PRN  ondansetron, 4 mg, Intravenous, Q6H PRN  oxyCODONE, 60 mg, Oral, BID PRN      Fingerstick ac and hs   I&O    Up and OOB       Network Utilization Review Department  ATTENTION: Please call with any questions or concerns to [phone] and carefully listen to the prompts so that you are directed to the right person. All voicemails are confidential.   For Discharge needs, contact Care Management DC Support Team at [phone] opt. [phone]  Send all requests for admission clinical reviews, approved or denied determinations and any other requests to dedicated fax number below belonging to the campus where the patient is receiving treatment. List of dedicated fax numbers for the Facilities:  FACILITY NAME UR FAX NUMBER   ADMISSION DENIALS (Administrative/Medical Necessity) [phone]   DISCHARGE SUPPORT TEAM (NETWORK) [phone]   PARENT CHILD HEALTH (Maternity/NICU/Pediatrics) [phone]   [facility] [phone]   [facility] [phone]   [facility] [phone]   [facility] [phone]   [facility] [phone]   [facility] [phone]   [facility] [phone]   [facility] [phone]   [facility] [phone]   [facility] [phone]   [facility] [phone]   [facility] [phone]

## 2024-03-29 NOTE — PROGRESS NOTES
[facility]  Progress Note  Name: Bar Amador I  MRN: [id number]  Unit/Bed#: -01 I Date of Admission: 3/28/2024   Date of Service: 3/29/2024 I Hospital Day: 1    Assessment/Plan   GIB (gastrointestinal bleeding)  Assessment & Plan  Acute onset.  1 episode this a.m.  Hemoglobin stable.  Bright red blood which was painless. Denies any further episodes.   CT a/p: Minimal inflammation in the pelvis between the prostate and rectum without an abscess can be attributable to the recent biopsy.  No active signs of bleeding  Will continue with subcutaneous heparin for DVT prophylaxis at this time as there is no active bleeding and hemoglobin continues to be stable.  If patient has repeat episode of GI bleed, will discontinue heparin product  Monitor with am cbc    H/O prostate biopsy  Assessment & Plan  Recommend follow-up results with urology as outpatient    UTI (urinary tract infection)  Assessment & Plan  Possibly complicated from prostatic biopsy for elevated PSA levels  Patient denies any deep prostatic pain  Treated with IV ceftriaxone and change antibiotic for speciation and sensitivity  F/u on urine culture    Type 2 diabetes mellitus with diabetic polyneuropathy, without long-term current use of insulin (HCC)  Assessment & Plan  Lab Results   Component Value Date    HGBA1C 7.8 (H) 01/17/2024       Recent Labs     03/28/24  1815 03/28/24  2115 03/29/24  1051   POCGLU 225* 240* 267*       Blood Sugar Average: Last 72 hrs:  (P) 244Carb controlled diet  Sliding scale insulin      Continuous opioid dependence (HCC)  Assessment & Plan  PDMP checked  Patient chronically takes 50 to 60 mg of oxycodone twice a day prn  Reviewed with pharmacy, continue home regimen   Monitor for any signs of toxicity    * Sepsis without acute organ dysfunction (HCC)  Assessment & Plan  SIRS criteria met with fever, tachycardia, leukocytosis and infectious source would be urine  Does not meet severe sepsis  criteria right now.  Lactic and procalcitonin normal  Status post 2 g IV ceftriaxone in the ER.  Plan to continue every 24 hours  Follow-up on blood and urine cultures  Continue IV fluids normal saline at 75 cc/h.  Status post 1 L bolus in the ER               VTE Pharmacologic Prophylaxis:   Moderate Risk (Score 3-4) - Pharmacological DVT Prophylaxis Ordered: heparin.    Mobility:   Basic Mobility Inpatient Raw Score: 24  JH-HLM Goal: 8: Walk 250 feet or more  JH-HLM Achieved: 7: Walk 25 feet or more  JH-HLM Goal NOT achieved. Continue with multidisciplinary rounding and encourage appropriate mobility to improve upon JH-HLM goals.    Patient Centered Rounds: I performed bedside rounds with nursing staff today.   Discussions with Specialists or Other Care Team Provider: CM    Education and Discussions with Family / Patient:  PT.     Total Time Spent on Date of Encounter in care of patient: 45mins. This time was spent on one or more of the following: performing physical exam; counseling and coordination of care; obtaining or reviewing history; documenting in the medical record; reviewing/ordering tests, medications or procedures; communicating with other healthcare professionals and discussing with patient's family/caregivers.    Current Length of Stay: 1 day(s)  Current Patient Status: Inpatient   Certification Statement: The patient will continue to require additional inpatient hospital stay due to IV abx, sepsis  Discharge Plan: Anticipate discharge tomorrow to home.    Code Status: Level 1 - Full Code    Subjective:   Pt denies hematochezia. Reports ongoing dysuria.     Objective:     Vitals:   Temp (24hrs), Av.8 °F (37.7 °C), Min:97.6 °F (36.4 °C), Max:101.4 °F (38.6 °C)    Temp:  [97.6 °F (36.4 °C)-101.4 °F (38.6 °C)] 100.4 °F (38 °C)  HR:  [] 94  Resp:  [18-20] 18  BP: (115-138)/(70-80) 115/70  SpO2:  [93 %-98 %] 95 %  There is no height or weight on file to calculate BMI.     Input and Output  Summary (last 24 hours):     Intake/Output Summary (Last 24 hours) at 3/29/2024 1224  Last data filed at 3/29/2024 0851  Gross per 24 hour   Intake 2153.75 ml   Output --   Net 2153.75 ml       Physical Exam:   Physical Exam  Constitutional:       General: He is not in acute distress.     Appearance: He is well-developed. He is not diaphoretic.   HENT:      Head: Normocephalic and atraumatic.      Mouth/Throat:      Pharynx: No oropharyngeal exudate.   Eyes:      General: No scleral icterus.     Extraocular Movements: Extraocular movements intact.      Conjunctiva/sclera: Conjunctivae normal.   Neck:      Vascular: No JVD.      Trachea: No tracheal deviation.   Cardiovascular:      Rate and Rhythm: Normal rate and regular rhythm.      Heart sounds: No murmur heard.     No friction rub. No gallop.   Pulmonary:      Effort: Pulmonary effort is normal. No respiratory distress.      Breath sounds: No stridor. No wheezing.   Abdominal:      General: There is no distension.      Palpations: Abdomen is soft. There is no mass.      Tenderness: There is no abdominal tenderness. There is no right CVA tenderness or left CVA tenderness.   Musculoskeletal:         General: No tenderness.      Right lower leg: No edema.      Left lower leg: No edema.   Skin:     General: Skin is warm.      Coloration: Skin is pale.      Findings: No erythema.   Neurological:      Mental Status: He is oriented to person, place, and time.   Psychiatric:         Behavior: Behavior normal.         Thought Content: Thought content normal.          Additional Data:     Labs:  Results from last 7 days   Lab Units 03/29/24  0508   WBC Thousand/uL 12.48*   HEMOGLOBIN g/dL 13.3   HEMATOCRIT % 37.0   PLATELETS Thousands/uL 132*   NEUTROS PCT % 78*   LYMPHS PCT % 13*   MONOS PCT % 8   EOS PCT % 1     Results from last 7 days   Lab Units 03/29/24  0508 03/28/24  1453   SODIUM mmol/L 137 136   POTASSIUM mmol/L 3.7 4.1   CHLORIDE mmol/L 102 102   CO2 mmol/L 27  26   BUN mg/dL 11 13   CREATININE mg/dL 0.71 0.83   ANION GAP mmol/L 8 8   CALCIUM mg/dL 9.9 10.2   ALBUMIN g/dL  --  4.4   TOTAL BILIRUBIN mg/dL  --  0.81   ALK PHOS U/L  --  57   ALT U/L  --  16   AST U/L  --  18   GLUCOSE RANDOM mg/dL 184* 282*     Results from last 7 days   Lab Units 03/28/24  1453   INR  1.06     Results from last 7 days   Lab Units 03/29/24  1051 03/28/24  2115 03/28/24  1815   POC GLUCOSE mg/dl 267* 240* 225*         Results from last 7 days   Lab Units 03/29/24  0508 03/28/24  1453   LACTIC ACID mmol/L  --  1.8   PROCALCITONIN ng/ml 0.11 0.09       Lines/Drains:  Invasive Devices       Peripheral Intravenous Line  Duration             Peripheral IV 03/28/24 Proximal;Right;Ventral (anterior) Forearm <1 day                          Imaging: No pertinent imaging reviewed.    Recent Cultures (last 7 days):   Results from last 7 days   Lab Units 03/28/24  1508 03/28/24  1453   BLOOD CULTURE  Received in Microbiology Lab. Culture in Progress. Received in Microbiology Lab. Culture in Progress.   URINE CULTURE   --  >100,000 cfu/ml Gram Negative Mohinder Enteric Like*       Last 24 Hours Medication List:   Current Facility-Administered Medications   Medication Dose Route Frequency Provider Last Rate    acetaminophen  650 mg Oral Q6H PRN Jerel Pacheco MD      ALPRAZolam  2 mg Oral HS PRN Selena Alvarado PA-C      cefTRIAXone  2,000 mg Intravenous Q24H Catrinadiego Seth, DO 2,000 mg (03/29/24 1110)    heparin (porcine)  5,000 Units Subcutaneous Q8H [facility] Jerel Pacheco MD      insulin lispro  1-5 Units Subcutaneous HS Jerel Pacheco MD      insulin lispro  1-6 Units Subcutaneous TID AC Jerel Pacheco MD      lactated ringers  75 mL/hr Intravenous Continuous Jerel Pacheoc MD 75 mL/hr (03/29/24 0548)    ondansetron  4 mg Intravenous Q6H PRN Jerel Pacheco MD      oxyCODONE  60 mg Oral BID PRN Catrina Seth DO      senna-docusate sodium  1 tablet Oral HS Catrina Seth DO          Today, Patient Was Seen By:  Catrina Seth DO    **Please Note: This note may have been constructed using a voice recognition system.**

## 2024-03-29 NOTE — ASSESSMENT & PLAN NOTE
Lab Results   Component Value Date    HGBA1C 7.8 (H) 01/17/2024       Recent Labs     03/28/24  1815 03/28/24  2115 03/29/24  1051   POCGLU 225* 240* 267*       Blood Sugar Average: Last 72 hrs:  (P) 244Carb controlled diet  Sliding scale insulin

## 2024-03-29 NOTE — CASE MANAGEMENT
Case Management Assessment & Discharge Planning Note    Patient name Bar Amador  Location /-01 MRN [id number]  : 1960 Date 3/29/2024       Current Admission Date: 3/28/2024  Current Admission Diagnosis:Sepsis without acute organ dysfunction (HCC)   Patient Active Problem List    Diagnosis Date Noted    Sepsis without acute organ dysfunction (HCC) 2024    UTI (urinary tract infection) 2024    H/O prostate biopsy 2024    GIB (gastrointestinal bleeding) 2024    Elevated PSA, less than 10 ng/ml 2024    Type 2 diabetes mellitus with diabetic polyneuropathy, without long-term current use of insulin ([facility]) 2023    Continuous opioid dependence ([facility]) 2022    Type 2 diabetes mellitus with hemoglobin A1c goal of less than 7.0% ([facility]) 2012    Spinal stenosis 1989      LOS (days): 1  Geometric Mean LOS (GMLOS) (days): 3.6  Days to GMLOS:2.6     OBJECTIVE:    Risk of Unplanned Readmission Score: 7.12         Current admission status: Inpatient       Preferred Pharmacy:   [id number] - [address] [address]  Phone: [phone] Fax: [phone]    Primary Care Provider: Helen Allen DO    Primary Insurance: BLUE CROSS  Secondary Insurance:     ASSESSMENT:  Active Health Care Proxies       [facility] [address] Health Care Representative - Spouse   Primary Phone: [phone] (Mobile)                 Advance Directives  Does patient have a Health Care POA?: No  Was patient offered paperwork?: Yes  Does patient currently have a Health Care decision maker?: Yes, please see Health Care Proxy section  Does patient have Advance Directives?: No  Was patient offered paperwork?: Yes  Primary Contact: Pat (Spouse)  [phone]         Readmission Root Cause  30 Day Readmission: No    Patient Information  Admitted from:: Home  Mental Status: Alert  During Assessment patient was  accompanied by: Not accompanied during assessment  Assessment information provided by:: Patient  Primary Caregiver: Self  Support Systems: Spouse/significant other, Self  County of Residence: [place]  What city do you live in?: [place]  Home entry access options. Select all that apply.: Stairs  Number of steps to enter home.: 8  Do the steps have railings?: Yes  Type of Current Residence: [address] [place] home  Upon entering residence, is there a bathroom on the main floor (no further steps)?: Yes  Living Arrangements: Lives w/ Spouse/significant other  Is patient a ?: Yes  Is patient active with [facility] ([place] [employer])?: No    Activities of Daily Living Prior to Admission  Functional Status: Independent  Completes ADLs independently?: Yes  Ambulates independently?: Yes  Does patient use assisted devices?: No  Does patient currently own DME?: No  Does patient have a history of Outpatient Therapy (PT/OT)?: Yes  Does the patient have a history of Short-Term Rehab?: No  Does patient have a history of HHC?: No  Does patient currently have HHC?: No         Patient Information Continued  Income Source: Employed  Does patient have prescription coverage?: Yes  Does patient receive dialysis treatments?: No  Does patient have a history of substance abuse?: No  Does patient have a history of Mental Health Diagnosis?: No         Means of Transportation  Means of Transport to Appts:: Drives Self      Social Determinants of Health (SDOH)      Flowsheet Row Most Recent Value   Housing Stability    In the last 12 months, was there a time when you were not able to pay the mortgage or rent on time? N   In the last 12 months, how many places have you lived? 1   In the last 12 months, was there a time when you did not have a steady place to sleep or slept in a shelter (including now)? N   Transportation Needs    In the past 12 months, has lack of transportation kept you from medical appointments or from getting medications? no   In  the past 12 months, has lack of transportation kept you from meetings, work, or from getting things needed for daily living? No   Food Insecurity    Within the past 12 months, you worried that your food would run out before you got the money to buy more. Never true   Within the past 12 months, the food you bought just didn't last and you didn't have money to get more. Never true   Utilities    In the past 12 months has the electric, gas, oil, or water company threatened to shut off services in your home? No            DISCHARGE DETAILS:    Discharge planning discussed with:: patient at bedside  Freedom of Choice: Yes  Comments - Freedom of Choice: CM maintained freedom of choice as it pertains to discharge planning. Patient reports he plans to return home at discharge, declining HHC or DME needs, patient reports being very independent still working and drives self, no CM needs anticipated.  CM contacted family/caregiver?: No- see comments (pt declined)  Were Treatment Team discharge recommendations reviewed with patient/caregiver?: Yes  Did patient/caregiver verbalize understanding of patient care needs?: Yes  Were patient/caregiver advised of the risks associated with not following Treatment Team discharge recommendations?: Yes    Requested Home Health Care         Is the patient interested in HHC at discharge?: No    DME Referral Provided  Referral made for DME?: No    Other Referral/Resources/Interventions Provided:  Interventions: None Indicated    Would you like to participate in our [facility] service program?  : No - Declined    Treatment Team Recommendation: Home  Discharge Destination Plan:: Home  Transport at Discharge : Family

## 2024-03-29 NOTE — UTILIZATION REVIEW
NOTIFICATION OF INPATIENT ADMISSION   AUTHORIZATION REQUEST   SERVICING FACILITY:   [place] [address]  Tax ID: [id number]  NPI: [id number] ATTENDING PROVIDER:  Attending Name and NPI#: Catrina Seth Do [[id number]]  Address: [address] [place] [address]  Phone: [phone]     ADMISSION INFORMATION:  Place of Service: Inpatient [facility]  Place of Service Code: 21  Inpatient Admission Date/Time: 3/28/24  4:24 PM  Discharge Date/Time: No discharge date for patient encounter.  Admitting Diagnosis Code/Description:  Blood in stool [K92.1]  Urinary tract infection [N39.0]  Post-operative complication [T81.9XXA]  Sepsis (HCC) [A41.9]     UTILIZATION REVIEW CONTACT:  Lani Toribio, Utilization   Network Utilization Review Department  Phone: [phone]  Fax [phone]  Email: [email or web address]  Contact for approvals/pending authorizations, clinical reviews, and discharge.     PHYSICIAN ADVISORY SERVICES:  Medical Necessity Denial & Mfny-dx-Xzlx Review  Phone: [phone]  Fax: [phone]  Email: [email or web address]     DISCHARGE SUPPORT TEAM:  For Patients Discharge Needs & Updates  Phone: [phone] opt. 2 Fax: [phone]  Email: [email or web address]

## 2024-03-29 NOTE — NURSING NOTE
Pt has dulcolax brought from home that will be sent to Pharmacy.  He states he was told it was okay for him to keep at bedside because it is an OTC medicine from home. Pt also requesting to medications left at bedside to decide how much he really wants to take of it. Pt also states that he needs 60 mgs of Oxy BID.

## 2024-03-30 VITALS
OXYGEN SATURATION: 95 % | HEART RATE: 88 BPM | SYSTOLIC BLOOD PRESSURE: 113 MMHG | RESPIRATION RATE: 18 BRPM | DIASTOLIC BLOOD PRESSURE: 79 MMHG | TEMPERATURE: 99.3 F

## 2024-03-30 LAB
ANION GAP SERPL CALCULATED.3IONS-SCNC: 7 MMOL/L (ref 4–13)
BACTERIA UR CULT: ABNORMAL
BUN SERPL-MCNC: 10 MG/DL (ref 5–25)
CALCIUM SERPL-MCNC: 10 MG/DL (ref 8.4–10.2)
CHLORIDE SERPL-SCNC: 101 MMOL/L (ref 96–108)
CO2 SERPL-SCNC: 28 MMOL/L (ref 21–32)
CREAT SERPL-MCNC: 0.82 MG/DL (ref 0.6–1.3)
ERYTHROCYTE [DISTWIDTH] IN BLOOD BY AUTOMATED COUNT: 12 % (ref 11.6–15.1)
GFR SERPL CREATININE-BSD FRML MDRD: 94 ML/MIN/1.73SQ M
GLUCOSE SERPL-MCNC: 184 MG/DL (ref 65–140)
GLUCOSE SERPL-MCNC: 190 MG/DL (ref 65–140)
HCT VFR BLD AUTO: 37 % (ref 36.5–49.3)
HGB BLD-MCNC: 13.6 G/DL (ref 12–17)
MCH RBC QN AUTO: 32 PG (ref 26.8–34.3)
MCHC RBC AUTO-ENTMCNC: 36.8 G/DL (ref 31.4–37.4)
MCV RBC AUTO: 87 FL (ref 82–98)
PLATELET # BLD AUTO: 156 THOUSANDS/UL (ref 149–390)
PMV BLD AUTO: 8.6 FL (ref 8.9–12.7)
POTASSIUM SERPL-SCNC: 3.8 MMOL/L (ref 3.5–5.3)
RBC # BLD AUTO: 4.25 MILLION/UL (ref 3.88–5.62)
SODIUM SERPL-SCNC: 136 MMOL/L (ref 135–147)
WBC # BLD AUTO: 8.81 THOUSAND/UL (ref 4.31–10.16)

## 2024-03-30 PROCEDURE — 80048 BASIC METABOLIC PNL TOTAL CA: CPT | Performed by: STUDENT IN AN ORGANIZED HEALTH CARE EDUCATION/TRAINING PROGRAM

## 2024-03-30 PROCEDURE — 82948 REAGENT STRIP/BLOOD GLUCOSE: CPT

## 2024-03-30 PROCEDURE — 99239 HOSP IP/OBS DSCHRG MGMT >30: CPT | Performed by: STUDENT IN AN ORGANIZED HEALTH CARE EDUCATION/TRAINING PROGRAM

## 2024-03-30 PROCEDURE — 85027 COMPLETE CBC AUTOMATED: CPT | Performed by: STUDENT IN AN ORGANIZED HEALTH CARE EDUCATION/TRAINING PROGRAM

## 2024-03-30 RX ORDER — SULFAMETHOXAZOLE AND TRIMETHOPRIM 800; 160 MG/1; MG/1
1 TABLET ORAL EVERY 12 HOURS SCHEDULED
Qty: 10 TABLET | Refills: 0 | Status: DISCONTINUED | OUTPATIENT
Start: 2024-03-30 | End: 2024-03-30 | Stop reason: HOSPADM

## 2024-03-30 RX ORDER — AMOXICILLIN 250 MG
2 CAPSULE ORAL 2 TIMES DAILY
Status: DISCONTINUED | OUTPATIENT
Start: 2024-03-30 | End: 2024-03-30 | Stop reason: HOSPADM

## 2024-03-30 RX ORDER — SULFAMETHOXAZOLE AND TRIMETHOPRIM 800; 160 MG/1; MG/1
1 TABLET ORAL EVERY 12 HOURS SCHEDULED
Qty: 10 TABLET | Refills: 0 | Status: SHIPPED | OUTPATIENT
Start: 2024-03-30 | End: 2024-04-04

## 2024-03-30 RX ORDER — POLYETHYLENE GLYCOL 3350 17 G/17G
17 POWDER, FOR SOLUTION ORAL DAILY
Status: DISCONTINUED | OUTPATIENT
Start: 2024-03-30 | End: 2024-03-30 | Stop reason: HOSPADM

## 2024-03-30 RX ORDER — BISACODYL 10 MG
10 SUPPOSITORY, RECTAL RECTAL DAILY PRN
Status: DISCONTINUED | OUTPATIENT
Start: 2024-03-30 | End: 2024-03-30 | Stop reason: HOSPADM

## 2024-03-30 RX ADMIN — POLYETHYLENE GLYCOL 3350 17 G: 17 POWDER, FOR SOLUTION ORAL at 09:56

## 2024-03-30 RX ADMIN — INSULIN LISPRO 2 UNITS: 100 INJECTION, SOLUTION INTRAVENOUS; SUBCUTANEOUS at 07:38

## 2024-03-30 RX ADMIN — SULFAMETHOXAZOLE AND TRIMETHOPRIM 1 TABLET: 800; 160 TABLET ORAL at 10:53

## 2024-03-30 RX ADMIN — OXYCODONE HYDROCHLORIDE 60 MG: 10 TABLET ORAL at 10:54

## 2024-03-30 RX ADMIN — DOCUSATE SODIUM AND SENNOSIDES 2 TABLET: 8.6; 5 TABLET ORAL at 09:56

## 2024-03-30 NOTE — ASSESSMENT & PLAN NOTE
Acute onset.  1 episode this a.m.  Hemoglobin stable.  Bright red blood which was painless. Denies any further episodes.   CT a/p: Minimal inflammation in the pelvis between the prostate and rectum without an abscess can be attributable to the recent biopsy.  No active signs of bleeding  Will continue with subcutaneous heparin for DVT prophylaxis at this time as there is no active bleeding and hemoglobin continues to be stable.  If patient has repeat episode of GI bleed, will discontinue heparin product  Patient reports normal bowel movement, denies blood  Continue with bowel regimen

## 2024-03-30 NOTE — ASSESSMENT & PLAN NOTE
SIRS criteria met with fever, tachycardia, leukocytosis and infectious source would be urine  Does not meet severe sepsis criteria right now.  Lactic and procalcitonin normal  Status post 2 g IV ceftriaxone in the ER.  Plan to continue every 24 hours  Urine culture than 100,000 and E. coli, today is day 2 of IV ceftriaxone.  Plan to transition to p.o. Bactrim for 5 additional days to complete 7-day course

## 2024-03-30 NOTE — DISCHARGE SUMMARY
[facility]  DISCHARGE SUMMARY  Name: Bar Amador I  MRN: [id number]  Unit/Bed#: -01 I Date of Admission: 3/28/2024   Date of Service: 3/30/2024 I Hospital Day: 2    Assessment/Plan   GIB (gastrointestinal bleeding)  Assessment & Plan  Acute onset.  1 episode this a.m.  Hemoglobin stable.  Bright red blood which was painless. Denies any further episodes.   CT a/p: Minimal inflammation in the pelvis between the prostate and rectum without an abscess can be attributable to the recent biopsy.  No active signs of bleeding  Will continue with subcutaneous heparin for DVT prophylaxis at this time as there is no active bleeding and hemoglobin continues to be stable.  If patient has repeat episode of GI bleed, will discontinue heparin product  Patient reports normal bowel movement, denies blood  Continue with bowel regimen    H/O prostate biopsy  Assessment & Plan  Recommend follow-up results with urology as outpatient    UTI (urinary tract infection)  Assessment & Plan  Possibly complicated from prostatic biopsy for elevated PSA levels  Patient denies any deep prostatic pain  See plan as above    Type 2 diabetes mellitus with diabetic polyneuropathy, without long-term current use of insulin (HCC)  Assessment & Plan  Lab Results   Component Value Date    HGBA1C 7.8 (H) 01/17/2024       Recent Labs     03/29/24  1051 03/29/24  1557 03/29/24  2122 03/30/24  0641   POCGLU 267* 266* 223* 190*         Blood Sugar Average: Last 72 hrs:  (P) [id number] controlled diet  Sliding scale insulin      Continuous opioid dependence (HCC)  Assessment & Plan  PDMP checked  Patient chronically takes 50 to 60 mg of oxycodone twice a day prn  Reviewed with pharmacy, continue home regimen   Monitor for any signs of toxicity    * Sepsis without acute organ dysfunction (HCC)  Assessment & Plan  SIRS criteria met with fever, tachycardia, leukocytosis and infectious source would be urine  Does not  meet severe sepsis criteria right now.  Lactic and procalcitonin normal  Status post 2 g IV ceftriaxone in the ER.  Plan to continue every 24 hours  Urine culture than 100,000 and E. coli, today is day 2 of IV ceftriaxone.  Plan to transition to p.o. Bactrim for 5 additional days to complete 7-day course             Medical Problems       Resolved Problems  Date Reviewed: 3/30/2024   None       Discharging Physician / Practitioner: Catrina Seth DO  PCP: Helen Allen DO  Admission Date:   Admission Orders (From admission, onward)       Ordered        03/28/24 1623  INPATIENT ADMISSION  Once                          Discharge Date: 03/30/24    Consultations During Hospital Stay:  None    Procedures Performed:       Significant Findings / Test Results:   CT with minimal inflammation in the pelvis between prostate and rectum without an abscess can be attributable to recent biopsy  Urine culture with greater than 100,000 E. coli  Incidental Findings:   none    Test Results Pending at Discharge (will require follow up):   None     Outpatient Tests Requested:  None    Complications: None    Reason for Admission: Dysuria and hematochezia    Hospital Course:   Bar Amador is a 63 y.o. male patient who originally presented to the hospital on 3/28/2024 due to dysuria and hematochezia.  He simply had prostate biopsy.  Hematochezia resolved on its own, regular bowel movements no further episodes of blood in stool.    Meeting sepsis criteria secondary to suspected UTI.  Improved on IV ceftriaxone.  Urine culture growing greater than 100,000 E. coli pansensitive.hemodynamically stable on day of discharge.  Patient to p.o. Bactrim to complete full 7-day course.  Advised to follow-up outpatient with urology and PCP in 1 to 2 weeks.      Please see above list of diagnoses and related plan for additional information.     Condition at Discharge: fair    Discharge Day Visit / Exam:   Subjective: Patient reports  improvement in dysuria.  Nausea, vomiting.  Denies fevers or chills.  Vitals: Blood Pressure: 113/79 (03/30/24 0644)  Pulse: 88 (03/30/24 0644)  Temperature: 99.3 °F (37.4 °C) (03/30/24 0644)  Temp Source: Oral (03/28/24 1611)  Respirations: 18 (03/28/24 2335)  SpO2: 95 % (03/30/24 0644)  Exam:   Physical Exam  Vitals and nursing note reviewed.   Constitutional:       General: He is not in acute distress.     Appearance: He is well-developed.   HENT:      Head: Normocephalic and atraumatic.   Eyes:      Conjunctiva/sclera: Conjunctivae normal.   Cardiovascular:      Rate and Rhythm: Normal rate and regular rhythm.      Heart sounds: No murmur heard.  Pulmonary:      Effort: Pulmonary effort is normal. No respiratory distress.      Breath sounds: Normal breath sounds.   Abdominal:      Palpations: Abdomen is soft.      Tenderness: There is no abdominal tenderness.   Musculoskeletal:         General: No swelling.      Cervical back: Neck supple.   Skin:     General: Skin is warm and dry.      Capillary Refill: Capillary refill takes less than 2 seconds.      Coloration: Skin is pale.   Neurological:      Mental Status: He is alert.   Psychiatric:         Mood and Affect: Mood normal.          Discussion with Family:  PT.     Discharge instructions/Information to patient and family:   See after visit summary for information provided to patient and family.      Provisions for Follow-Up Care:  See after visit summary for information related to follow-up care and any pertinent home health orders.      Mobility at time of Discharge:   Basic Mobility Inpatient Raw Score: 24  JH-HLM Goal: 8: Walk 250 feet or more  JH-HLM Achieved: 7: Walk 25 feet or more  HLM Goal NOT achieved. Continue to encourage mobility in post discharge setting.     Disposition:   Home    Planned Readmission: none     Discharge Statement:  I spent 60 minutes discharging the patient. This time was spent on the day of discharge. I had direct contact with  the patient on the day of discharge. Greater than 50% of the total time was spent examining patient, answering all patient questions, arranging and discussing plan of care with patient as well as directly providing post-discharge instructions.  Additional time then spent on discharge activities.    Discharge Medications:  See after visit summary for reconciled discharge medications provided to patient and/or family.      **Please Note: This note may have been constructed using a voice recognition system**

## 2024-03-30 NOTE — ASSESSMENT & PLAN NOTE
Possibly complicated from prostatic biopsy for elevated PSA levels  Patient denies any deep prostatic pain  See plan as above

## 2024-04-01 ENCOUNTER — TRANSITIONAL CARE MANAGEMENT (OUTPATIENT)
Dept: FAMILY MEDICINE CLINIC | Facility: CLINIC | Age: 64
End: 2024-04-01

## 2024-04-01 NOTE — TELEPHONE ENCOUNTER
Date: 4/16/2024 Status: [facility]   Arrival Time 3:45 PM Length: 30   Visit Type: OFFICE VISIT LONG PG [[id number]] Copay: $50.00   Provider: Shaun Longoria MD Department: [facility]     Verbally confirmed with the patient.

## 2024-04-02 ENCOUNTER — TELEPHONE (OUTPATIENT)
Dept: FAMILY MEDICINE CLINIC | Facility: CLINIC | Age: 64
End: 2024-04-02

## 2024-04-02 LAB
BACTERIA BLD CULT: NORMAL
BACTERIA BLD CULT: NORMAL

## 2024-04-03 NOTE — UTILIZATION REVIEW
NOTIFICATION OF ADMISSION DISCHARGE   This is a Notification of Discharge from [facility]. Please be advised that this patient has been discharge from our facility. Below you will find the admission and discharge date and time including the patient’s disposition.   UTILIZATION REVIEW CONTACT:  Carmela Toribio  Utilization   Network Utilization Review Department  Phone: [phone] x carefully listen to the prompts. All voicemails are confidential.  Email: [email or web address]     ADMISSION INFORMATION  PRESENTATION DATE: 3/28/2024  2:08 PM  OBERVATION ADMISSION DATE:   INPATIENT ADMISSION DATE: 3/28/24  4:24 PM   DISCHARGE DATE: 3/30/2024 12:49 PM   DISPOSITION:Home/Self Care    Network Utilization Review Department  ATTENTION: Please call with any questions or concerns to [phone] and carefully listen to the prompts so that you are directed to the right person. All voicemails are confidential.   For Discharge needs, contact Care Management DC Support Team at [phone] opt. 2  Send all requests for admission clinical reviews, approved or denied determinations and any other requests to dedicated fax number below belonging to the campus where the patient is receiving treatment. List of dedicated fax numbers for the Facilities:  FACILITY NAME UR FAX NUMBER   ADMISSION DENIALS (Administrative/Medical Necessity) [phone]   DISCHARGE SUPPORT TEAM ([facility]) [phone]   PARENT CHILD HEALTH (Maternity/NICU/Pediatrics) [phone]   [facility] [phone]   [facility] [phone]   [facility] [phone]   [facility] [phone]   [facility] [phone]   General acute hospital [phone]   [facility] [phone]   [facility]  [phone]   [facility] [phone]   [facility] [phone]   [facility] [phone]   [facility] [phone]

## 2024-04-12 ENCOUNTER — OFFICE VISIT (OUTPATIENT)
Dept: FAMILY MEDICINE CLINIC | Facility: CLINIC | Age: 64
End: 2024-04-12

## 2024-04-12 VITALS
HEART RATE: 75 BPM | TEMPERATURE: 97.6 F | BODY MASS INDEX: 27.83 KG/M2 | SYSTOLIC BLOOD PRESSURE: 116 MMHG | DIASTOLIC BLOOD PRESSURE: 72 MMHG | HEIGHT: 73 IN | OXYGEN SATURATION: 96 % | WEIGHT: 210 LBS

## 2024-04-12 DIAGNOSIS — Z98.890 H/O PROSTATE BIOPSY: Primary | ICD-10-CM

## 2024-04-12 DIAGNOSIS — E11.42 TYPE 2 DIABETES MELLITUS WITH DIABETIC POLYNEUROPATHY, WITHOUT LONG-TERM CURRENT USE OF INSULIN (HCC): ICD-10-CM

## 2024-04-12 DIAGNOSIS — K92.2 GASTROINTESTINAL HEMORRHAGE, UNSPECIFIED GASTROINTESTINAL HEMORRHAGE TYPE: ICD-10-CM

## 2024-04-12 RX ORDER — METFORMIN HYDROCHLORIDE 500 MG/1
1000 TABLET, EXTENDED RELEASE ORAL
Qty: 180 TABLET | Refills: 1 | Status: SHIPPED | OUTPATIENT
Start: 2024-04-12

## 2024-04-12 NOTE — PROGRESS NOTES
Assessment & Plan     1. H/O prostate biopsy    2. Type 2 diabetes mellitus with diabetic polyneuropathy, without long-term current use of insulin (HCC)  -     metFORMIN (GLUCOPHAGE-XR) 500 mg 24 hr tablet; Take 2 tablets (1,000 mg total) by mouth daily with dinner  -     sitaGLIPtin (JANUVIA) 50 mg tablet; Take 1 tablet (50 mg total) by mouth daily  -     IRIS Diabetic eye exam    3. Gastrointestinal hemorrhage, unspecified gastrointestinal hemorrhage type    No GI bleeding since one episode after prostate biopsy. ABX completed.      Subjective     Transitional Care Management Review:   Bar Amador is a 63 y.o. male here for TCM follow up.     During the TCM phone call patient stated:  TCM Call     Date and time call was made  4/1/2024  9:47 AM    Hospital care reviewed  Records reviewed    Date of Admission  03/28/24    Date of discharge  03/30/24    Diagnosis  Sepsis without acute organ dysfunction    Disposition  Home    Were the patients medications reviewed and updated  Yes    Current Symptoms  None      TCM Call     Post hospital issues  None    Should patient be enrolled in anticoag monitoring?  No    Scheduled for follow up?  Yes    Did you obtain your prescribed medications  Yes    Do you need help managing your prescriptions or medications  No    Is transportation to your appointment needed  No    I have advised the patient to call PCP with any new or worsening symptoms  Loraine Pérez/ MA    Living Arrangements  Spouse or Significiant other    Support System  Spouse    The type of support provided  Emotional    Do you have social support  Yes, as much as I need    Are you recieving any outpatient services  No    Are you recieving home care services  No    Are you using any community resources  No    Current waiver services  No    Have you fallen in the last 12 months  No    Interperter language line needed  No    Counseling  Patient    Counseling topics  Activities of daily living; patient and family  "education    Comments  [facility] scheduled for 4/12 at 2:40        HPI  Patient states that he is doing very well. Denies fever or chills. Denies bleeding with Bms, pain with Bms. Denie surinary symptoms.   Notes that he had intercourse for the first time, pain with ejaculation  Second time, things felt a lot better.  1 Of 12 biopsyies positive for prostate cancer.   Father had prostate cancer,     Has follow up with urology to discuss option.      Review of Systems    Objective     /72   Pulse 75   Temp 97.6 °F (36.4 °C)   Ht 6' 1\" (1.854 m)   Wt 95.3 kg (210 lb)   SpO2 96%   BMI 27.71 kg/m²      Physical Exam  Vitals reviewed.   Constitutional:       General: He is not in acute distress.     Appearance: Normal appearance.   HENT:      Head: Normocephalic and atraumatic.      Right Ear: External ear normal.      Left Ear: External ear normal.      Nose: Nose normal.      Mouth/Throat:      Mouth: Mucous membranes are moist.   Eyes:      Extraocular Movements: Extraocular movements intact.      Conjunctiva/sclera: Conjunctivae normal.   Cardiovascular:      Rate and Rhythm: Normal rate and regular rhythm.      Heart sounds: Normal heart sounds.   Pulmonary:      Effort: Pulmonary effort is normal.      Breath sounds: Normal breath sounds. No wheezing, rhonchi or rales.   Abdominal:      General: Bowel sounds are normal. There is no distension.      Palpations: Abdomen is soft.      Tenderness: There is no abdominal tenderness.   Musculoskeletal:      Right lower leg: No edema.      Left lower leg: No edema.   Skin:     General: Skin is warm.      Capillary Refill: Capillary refill takes less than 2 seconds.      Findings: No rash.   Neurological:      Mental Status: He is alert. Mental status is at baseline.       Medications have been reviewed by provider in current encounter    Helen Allen DO         "

## 2024-04-14 LAB
LEFT EYE DIABETIC RETINOPATHY: NORMAL
LEFT EYE IMAGE QUALITY: NORMAL
LEFT EYE MACULAR EDEMA: NORMAL
LEFT EYE OTHER RETINOPATHY: NORMAL
RIGHT EYE DIABETIC RETINOPATHY: NORMAL
RIGHT EYE IMAGE QUALITY: NORMAL
RIGHT EYE MACULAR EDEMA: NORMAL
RIGHT EYE OTHER RETINOPATHY: NORMAL
SEVERITY (EYE EXAM): NORMAL

## 2024-04-16 ENCOUNTER — OFFICE VISIT (OUTPATIENT)
Dept: UROLOGY | Facility: CLINIC | Age: 64
End: 2024-04-16
Payer: COMMERCIAL

## 2024-04-16 ENCOUNTER — TELEPHONE (OUTPATIENT)
Dept: UROLOGY | Facility: CLINIC | Age: 64
End: 2024-04-16

## 2024-04-16 VITALS
DIASTOLIC BLOOD PRESSURE: 78 MMHG | HEIGHT: 73 IN | HEART RATE: 74 BPM | BODY MASS INDEX: 27.54 KG/M2 | WEIGHT: 207.8 LBS | SYSTOLIC BLOOD PRESSURE: 122 MMHG | OXYGEN SATURATION: 99 %

## 2024-04-16 DIAGNOSIS — R97.20 ELEVATED PSA, LESS THAN 10 NG/ML: Primary | ICD-10-CM

## 2024-04-16 PROCEDURE — 99214 OFFICE O/P EST MOD 30 MIN: CPT | Performed by: UROLOGY

## 2024-04-16 NOTE — TELEPHONE ENCOUNTER
Left message for Bar and his spouse natalya asking if they could come in earlier today 4/16/24 for his appointment. He is scheduled for 4pm was wondering if could come in at 3pm.

## 2024-04-16 NOTE — PROGRESS NOTES
Referring Physician: Helen Allen DO  A copy of this note was sent to the referring physician.       Diagnoses and all orders for this visit:    Elevated PSA, less than 10 ng/ml  -     PSA Total, Diagnostic; Future            Assessment and plan:       1.  Elevated PSA  -Status post negative prostate biopsy  -1 core serrated small atypical demonstrated atypia but no cancer was present    2.  Recent history of postbiopsy fever  -Cultures were negative, likely consistent with postprocedural prostatitis  -Antibiotic course completed, making an excellent clinical recovery    I was very happy to review the negative biopsy with the patient. He understands that he has no evidence of prostate cancer and that he will require continued prostate cancer screening. Non neoplastic etiologies for his elevated PSA were discussed such as underlying BPH or inflammation/prostatitis. The possibility of a false negative biopsy was also discussed.  As such I recommended continued prostate cancer screening as the standard of care.     Given the presence of atypia we will repeat PSA in 6 months instead of the standard 1 year    Patient is actually scheduled for a prostate MRI in July.  There is no need for the study since he has completed an transrectal biopsy that was negative.  Our office will cancel this    We discussed that if the PSA were to rise in the future then the next step would be to complete an MRI and plan for repeat biopsy via a transperineal approach if clinically indicated.    Follow-up with advanced practitioner team in 6 months with a PSA prior    Shaun Longoria MD      Chief Complaint     Biopsy follow-up      History of Present Illness     Bar Amador is a 63 y.o. turns in follow-up for prostate biopsy.  Unfortunate patient developed a febrile urinary tract infection which required a 48-hour hospitalization for antibiotics after the biopsy.  All blood cultures were negative    Detailed Urologic History      - please refer to HPI    Review of Systems     Review of Systems   Constitutional: Negative for activity change and fatigue.   HENT: Negative for congestion.    Eyes: Negative for visual disturbance.   Respiratory: Negative for shortness of breath and wheezing.    Cardiovascular: Negative for chest pain and leg swelling.   Gastrointestinal: Negative for abdominal pain.   Endocrine: Negative for polyuria.   Genitourinary: Negative for dysuria, flank pain, hematuria and urgency.   Musculoskeletal: Negative for back pain.   Allergic/Immunologic: Negative for immunocompromised state.   Neurological: Negative for dizziness and numbness.   Psychiatric/Behavioral: Negative for dysphoric mood.   All other systems reviewed and are negative.    AUA SYMPTOM SCORE      Flowsheet Row Most Recent Value   AUA SYMPTOM SCORE    How often have you had a sensation of not emptying your bladder completely after you finished urinating? 4 (P)    How often have you had to urinate again less than two hours after you finished urinating? 4 (P)    How often have you found you stopped and started again several times when you urinate? 4 (P)    How often have you found it difficult to postpone urination? 4 (P)    How often have you had a weak urinary stream? 4 (P)    How often have you had to push or strain to begin urination? 1 (P)    How many times did you most typically get up to urinate from the time you went to bed at night until the time you got up in the morning? 5 (P)    Quality of Life: If you were to spend the rest of your life with your urinary condition just the way it is now, how would you feel about that? 2 (P)    AUA SYMPTOM SCORE 26 (P)               Allergies     Allergies   Allergen Reactions    Penicillins Hives     rash and hives      Prednisone Dizziness     myalgia         Physical Exam       Physical Exam  Constitutional:       General: He is not in acute distress.     Appearance: He is well-developed.   HENT:       "Head: Normocephalic and atraumatic.   Cardiovascular:      Comments: Negative lower extremity edema  Pulmonary:      Effort: Pulmonary effort is normal.      Breath sounds: Normal breath sounds.   Abdominal:      Palpations: Abdomen is soft.   Musculoskeletal:         General: Normal range of motion.      Cervical back: Normal range of motion.   Skin:     General: Skin is warm.   Neurological:      Mental Status: He is alert and oriented to person, place, and time.   Psychiatric:         Behavior: Behavior normal.           Vital Signs  Vitals:    04/16/24 1514   BP: 122/78   BP Location: Left arm   Patient Position: Sitting   Cuff Size: Adult   Pulse: 74   SpO2: 99%   Weight: 94.3 kg (207 lb 12.8 oz)   Height: 6' 1\" (1.854 m)         Current Medications       Current Outpatient Medications:     ALPRAZolam (XANAX) 1 mg tablet, take 1 tablet by mouth every morning and 2 tablets by mouth AT NIGHT if needed, Disp: , Rfl:     Boswellia-Glucosamine-Vit D (OSTEO BI-FLEX ONE PER DAY PO), Take by mouth, Disp: , Rfl:     Calcium-Vitamin D-Vitamin K (CALCIUM + D + K PO), Take 2,400 mg by mouth, Disp: , Rfl:     metFORMIN (GLUCOPHAGE-XR) 500 mg 24 hr tablet, Take 2 tablets (1,000 mg total) by mouth daily with dinner, Disp: 180 tablet, Rfl: 1    omeprazole (PriLOSEC) 20 mg delayed release capsule, Take by mouth daily, Disp: , Rfl:     oxyCODONE (ROXICODONE) 10 MG TABS, take 2 tablets by mouth every 6 hours if needed for MODERATE TO S...  (REFER TO PRESCRIPTION NOTES)., Disp: , Rfl:     oxyCODONE (ROXICODONE) 5 immediate release tablet, take 1 tablet by mouth every 4 hours if needed for MODERATE TO SEVERE PAIN, Disp: , Rfl:     sitaGLIPtin (JANUVIA) 50 mg tablet, Take 1 tablet (50 mg total) by mouth daily, Disp: 90 tablet, Rfl: 1    VITAMIN D PO, Take by mouth, Disp: , Rfl:     ibuprofen (MOTRIN) 600 mg tablet, Take 600 mg by mouth every 6 (six) hours as needed (Patient not taking: Reported on 4/12/2024), Disp: , Rfl:     " oxyCODONE (ROXICODONE) 15 mg immediate release tablet, Take 60 mg by mouth every 12 (twelve) hours as needed (Patient not taking: Reported on 4/12/2024), Disp: , Rfl:       Active Problems     Patient Active Problem List   Diagnosis    Type 2 diabetes mellitus with hemoglobin A1c goal of less than 7.0% (HCC)    Continuous opioid dependence (HCC)    Type 2 diabetes mellitus with diabetic polyneuropathy, without long-term current use of insulin (HCC)    Spinal stenosis    Elevated PSA, less than 10 ng/ml    Sepsis without acute organ dysfunction (HCC)    UTI (urinary tract infection)    H/O prostate biopsy    GIB (gastrointestinal bleeding)         Past Medical History     Past Medical History:   Diagnosis Date    Chronic pain disorder     neck and back    Colon polyp     Cough     Diabetes (HCC)     Diabetes mellitus (HCC)     Hyperlipidemia     Scoliosis          Surgical History     Past Surgical History:   Procedure Laterality Date    TOOTH EXTRACTION  06/2021         Family History     Family History   Problem Relation Age of Onset    Hypertension Mother     Hyperlipidemia Mother     Prostate cancer Father     Heart disease Father     Stroke Father     Diabetes Father     Cancer Father          Social History     Social History     Social History     Tobacco Use   Smoking Status Never   Smokeless Tobacco Never         Pertinent Lab Values     Lab Results   Component Value Date    CREATININE 0.82 03/30/2024       Lab Results   Component Value Date    PSA 6.18 (H) 02/14/2024    PSA 5.59 (H) 01/17/2024       @RESULTRCNT(1H])@      Pertinent Imaging       CT abdomen pelvis w contrast    Result Date: 3/29/2024  Narrative: CT ABDOMEN AND PELVIS WITH IV CONTRAST INDICATION: GIB. COMPARISON: None. TECHNIQUE: CT examination of the abdomen and pelvis was performed. Multiplanar 2D reformatted images were created from the source data. This examination, like all CT scans performed in the [facility], was  "performed utilizing techniques to minimize radiation dose exposure, including the use of iterative reconstruction and automated exposure control. Radiation dose length product (DLP) for this visit: 727 mGy-cm IV Contrast: 100 mL of iohexol (OMNIPAQUE) Enteric Contrast: Not administered. FINDINGS: ABDOMEN LOWER CHEST: No clinically significant abnormality in the visualized lower chest. LIVER/BILIARY TREE: Unremarkable. GALLBLADDER: No calcified gallstones. No pericholecystic inflammatory change. SPLEEN: 0.9 cm hypoattenuating lesion may be a hemangioma or cyst PANCREAS: Unremarkable. ADRENAL GLANDS: Unremarkable. KIDNEYS/URETERS: 0.4 cm nonobstructing left renal calculus. No hydroureteronephrosis. STOMACH AND BOWEL: Unremarkable. APPENDIX: Normal. ABDOMINOPELVIC CAVITY: No ascites. No pneumoperitoneum. No lymphadenopathy. VESSELS: Unremarkable for patient's age. PELVIS REPRODUCTIVE ORGANS: Enlarged prostate. Mild fat stranding adjacent to the seminal vesicles and between the prostate and rectum. No rim-enhancing fluid collection URINARY BLADDER: Unremarkable. ABDOMINAL WALL/INGUINAL REGIONS: Unremarkable. BONES: No acute fracture or suspicious osseous lesion. Spinal degenerative changes.     Impression: 1.  Minimal inflammation in the pelvis between the prostate and rectum without an abscess can be attributable to the recent biopsy. 2.  No findings of active bleeding in the gastrointestinal tract or source for patient's reported gastrointestinal bleeding. Workstation performed: [id number]         Portions of the record may have been created with voice recognition software.  Occasional wrong word or \"sound a like\" substitutions may have occurred due to the inherent limitations of voice recognition software.  In addition some of the content generated from this outpatient encounter includes information designed for patient education and/or communication back to the referring provider.  Read the chart carefully and " recognize, using context, where substitutions have occurred.

## 2024-05-01 PROBLEM — N39.0 UTI (URINARY TRACT INFECTION): Status: RESOLVED | Noted: 2024-03-28 | Resolved: 2024-05-01

## 2024-07-18 ENCOUNTER — OFFICE VISIT (OUTPATIENT)
Dept: FAMILY MEDICINE CLINIC | Facility: CLINIC | Age: 64
End: 2024-07-18
Payer: COMMERCIAL

## 2024-07-18 ENCOUNTER — APPOINTMENT (OUTPATIENT)
Dept: LAB | Facility: HOSPITAL | Age: 64
End: 2024-07-18
Payer: COMMERCIAL

## 2024-07-18 ENCOUNTER — HOSPITAL ENCOUNTER (OUTPATIENT)
Dept: RADIOLOGY | Facility: HOSPITAL | Age: 64
End: 2024-07-18
Payer: COMMERCIAL

## 2024-07-18 VITALS
BODY MASS INDEX: 27.3 KG/M2 | TEMPERATURE: 98.1 F | OXYGEN SATURATION: 98 % | HEART RATE: 77 BPM | SYSTOLIC BLOOD PRESSURE: 120 MMHG | DIASTOLIC BLOOD PRESSURE: 86 MMHG | WEIGHT: 206 LBS | HEIGHT: 73 IN

## 2024-07-18 DIAGNOSIS — M25.532 ACUTE PAIN OF LEFT WRIST: ICD-10-CM

## 2024-07-18 DIAGNOSIS — M10.032 ACUTE IDIOPATHIC GOUT OF LEFT WRIST: ICD-10-CM

## 2024-07-18 DIAGNOSIS — E11.9 TYPE 2 DIABETES MELLITUS WITH HEMOGLOBIN A1C GOAL OF LESS THAN 7.0% (HCC): Primary | ICD-10-CM

## 2024-07-18 DIAGNOSIS — E11.9 TYPE 2 DIABETES MELLITUS WITH HEMOGLOBIN A1C GOAL OF LESS THAN 7.0% (HCC): ICD-10-CM

## 2024-07-18 LAB
B BURGDOR IGG+IGM SER QL IA: NEGATIVE
BASOPHILS # BLD AUTO: 0.02 THOUSANDS/ÂΜL (ref 0–0.1)
BASOPHILS NFR BLD AUTO: 0 % (ref 0–1)
EOSINOPHIL # BLD AUTO: 0.24 THOUSAND/ÂΜL (ref 0–0.61)
EOSINOPHIL NFR BLD AUTO: 3 % (ref 0–6)
ERYTHROCYTE [DISTWIDTH] IN BLOOD BY AUTOMATED COUNT: 12.4 % (ref 11.6–15.1)
HCT VFR BLD AUTO: 37.8 % (ref 36.5–49.3)
HGB BLD-MCNC: 13.4 G/DL (ref 12–17)
IMM GRANULOCYTES # BLD AUTO: 0.02 THOUSAND/UL (ref 0–0.2)
IMM GRANULOCYTES NFR BLD AUTO: 0 % (ref 0–2)
LYMPHOCYTES # BLD AUTO: 2.1 THOUSANDS/ÂΜL (ref 0.6–4.47)
LYMPHOCYTES NFR BLD AUTO: 24 % (ref 14–44)
MCH RBC QN AUTO: 30.8 PG (ref 26.8–34.3)
MCHC RBC AUTO-ENTMCNC: 35.4 G/DL (ref 31.4–37.4)
MCV RBC AUTO: 87 FL (ref 82–98)
MONOCYTES # BLD AUTO: 0.75 THOUSAND/ÂΜL (ref 0.17–1.22)
MONOCYTES NFR BLD AUTO: 9 % (ref 4–12)
NEUTROPHILS # BLD AUTO: 5.54 THOUSANDS/ÂΜL (ref 1.85–7.62)
NEUTS SEG NFR BLD AUTO: 64 % (ref 43–75)
NRBC BLD AUTO-RTO: 0 /100 WBCS
PLATELET # BLD AUTO: 181 THOUSANDS/UL (ref 149–390)
PMV BLD AUTO: 9 FL (ref 8.9–12.7)
RBC # BLD AUTO: 4.35 MILLION/UL (ref 3.88–5.62)
SL AMB POCT HEMOGLOBIN AIC: 6.8 (ref ?–6.5)
URATE SERPL-MCNC: 3.7 MG/DL (ref 3.5–8.5)
WBC # BLD AUTO: 8.67 THOUSAND/UL (ref 4.31–10.16)

## 2024-07-18 PROCEDURE — 99214 OFFICE O/P EST MOD 30 MIN: CPT | Performed by: FAMILY MEDICINE

## 2024-07-18 PROCEDURE — 73110 X-RAY EXAM OF WRIST: CPT

## 2024-07-18 PROCEDURE — 84550 ASSAY OF BLOOD/URIC ACID: CPT

## 2024-07-18 PROCEDURE — 36415 COLL VENOUS BLD VENIPUNCTURE: CPT

## 2024-07-18 PROCEDURE — 83036 HEMOGLOBIN GLYCOSYLATED A1C: CPT | Performed by: FAMILY MEDICINE

## 2024-07-18 PROCEDURE — 85025 COMPLETE CBC W/AUTO DIFF WBC: CPT

## 2024-07-18 PROCEDURE — 86618 LYME DISEASE ANTIBODY: CPT

## 2024-07-18 RX ORDER — COLCHICINE 0.6 MG/1
TABLET ORAL
Qty: 30 TABLET | Refills: 0 | Status: SHIPPED | OUTPATIENT
Start: 2024-07-18

## 2024-07-18 NOTE — PROGRESS NOTES
"Ambulatory Visit  Name: Bar Amador      : 1960      MRN: [id number]  Encounter Provider: Helen Allen DO  Encounter Date: 2024   Encounter department: [facility] [address] [place] [address]    Assessment & Plan   1. Type 2 diabetes mellitus with hemoglobin A1c goal of less than 7.0% ([facility])  -     POCT hemoglobin A1c  -     CBC and differential; Future  2. Acute pain of left wrist  -     XR wrist 3+ vw left; Future; Expected date: 2024  -     colchicine (COLCRYS) 0.6 mg tablet; Take 1.2 mg (2 tablets), then 1 hour later take 0.6 mg (1 tablet). Day 2, take 0.6 mg in the AM and PM for the next 3 days.  -     Uric acid; Future  -     Lyme Total AB W Reflex to IGM/IGG; Future  3. Acute idiopathic gout of left wrist  -     colchicine (COLCRYS) 0.6 mg tablet; Take 1.2 mg (2 tablets), then 1 hour later take 0.6 mg (1 tablet). Day 2, take 0.6 mg in the AM and PM for the next 3 days.  -     Uric acid; Future  -     CBC and differential; Future       History of Present Illness     HPI    Patient presents to the office for evaluation.     Notes that she started with left hand/wrist swelling on 24. Denies any known injury. Denies any insect bites that he knows of. States that he has been wearing a carpal tunnel brace without improvement. States that he swelling is ever present. Pain is located at the top of the wrist. Denies any new pain in the elbow. Denies pain in the fingers but notes that he can't make a fist without pain. Has been using Motrin, this does not seem to help. Has not used ice or heat. States that this does not seem to be improving.     Hgb A1c of 6.8 today. Improved from 7.8.     Review of Systems    Objective     /86 (BP Location: Left arm, Patient Position: Sitting, Cuff Size: Standard)   Pulse 77   Temp 98.1 °F (36.7 °C) (Tympanic)   Ht 6' 1\" (1.854 m)   Wt 93.4 kg (206 lb)   SpO2 98%   BMI 27.18 kg/m²     Physical Exam  Vitals reviewed. "   Constitutional:       General: He is not in acute distress.     Appearance: Normal appearance.   HENT:      Head: Normocephalic and atraumatic.      Right Ear: External ear normal.      Left Ear: External ear normal.      Nose: Nose normal.      Mouth/Throat:      Mouth: Mucous membranes are moist.   Eyes:      Extraocular Movements: Extraocular movements intact.      Conjunctiva/sclera: Conjunctivae normal.   Cardiovascular:      Rate and Rhythm: Normal rate and regular rhythm.   Pulmonary:      Effort: Pulmonary effort is normal.      Breath sounds: Normal breath sounds. No wheezing, rhonchi or rales.   Abdominal:      General: Bowel sounds are normal. There is no distension.      Palpations: Abdomen is soft.      Tenderness: There is no abdominal tenderness.   Musculoskeletal:         General: Swelling (overlying lateral aspect of left wrist, dorsal side.) and tenderness (overlying lateral aspect of the left wrist on the dorsal side.) present.      Right lower leg: No edema.      Left lower leg: No edema.      Comments: Mild erythema present, no warmth.     Finkelstein's able to complete due to ROM restriction.    Skin:     General: Skin is warm.      Capillary Refill: Capillary refill takes less than 2 seconds.      Findings: No rash.   Neurological:      Mental Status: He is alert. Mental status is at baseline.       Administrative Statements       DO Agustina Loveroe [facility]  7/18/2024 1:00 PM

## 2024-07-19 ENCOUNTER — TELEPHONE (OUTPATIENT)
Dept: FAMILY MEDICINE CLINIC | Facility: CLINIC | Age: 64
End: 2024-07-19

## 2024-07-19 NOTE — TELEPHONE ENCOUNTER
Spoke with patient- reviewed his labs with him. Because his uric acid came back on the low side of normal, he wants to know, does he still need to take that medication? He didn't see the point in taking another pill if it's not absolutely necessary. If it will help to lower the inflammation, he is open to it. He knows he is full of arthritis. He wants to know also, should he now see an arthritis specialist??    He stated that we can leave a detailed message if he misses our call.  Please advise, thank you!

## 2024-10-09 ENCOUNTER — TELEPHONE (OUTPATIENT)
Age: 64
End: 2024-10-09

## 2024-10-09 DIAGNOSIS — M25.512 CHRONIC LEFT SHOULDER PAIN: Primary | ICD-10-CM

## 2024-10-09 DIAGNOSIS — G89.29 CHRONIC LEFT SHOULDER PAIN: Primary | ICD-10-CM

## 2024-10-09 NOTE — TELEPHONE ENCOUNTER
Patient is requesting referral be placed for ortho due to left arm issues. He states it hurts to even  a pen. He was previous referred to Ortho at the hospital he says he does NOT want to go to that same doctor. Please advise.

## 2024-10-11 ENCOUNTER — TELEPHONE (OUTPATIENT)
Dept: UROLOGY | Facility: CLINIC | Age: 64
End: 2024-10-11

## 2024-10-11 NOTE — TELEPHONE ENCOUNTER
LVM per cc proving a friendly reminder to please their PSA done prior to their appt. Informed pt they can get done at any [facility]. Provided office number.

## 2024-10-14 NOTE — TELEPHONE ENCOUNTER
Pt called to reschedule his 10/16 PSA follow up as he will not have time to complete his lab work prior. Pt rescheduled for 11/19/24.

## 2024-10-20 DIAGNOSIS — E11.42 TYPE 2 DIABETES MELLITUS WITH DIABETIC POLYNEUROPATHY, WITHOUT LONG-TERM CURRENT USE OF INSULIN (HCC): ICD-10-CM

## 2024-10-20 RX ORDER — METFORMIN HYDROCHLORIDE 500 MG/1
1000 TABLET, EXTENDED RELEASE ORAL
Qty: 180 TABLET | Refills: 0 | Status: SHIPPED | OUTPATIENT
Start: 2024-10-20

## 2024-11-06 ENCOUNTER — APPOINTMENT (OUTPATIENT)
Dept: RADIOLOGY | Facility: CLINIC | Age: 64
End: 2024-11-06
Payer: COMMERCIAL

## 2024-11-06 ENCOUNTER — OFFICE VISIT (OUTPATIENT)
Dept: OBGYN CLINIC | Facility: CLINIC | Age: 64
End: 2024-11-06
Payer: COMMERCIAL

## 2024-11-06 VITALS
WEIGHT: 205 LBS | DIASTOLIC BLOOD PRESSURE: 76 MMHG | HEIGHT: 73 IN | HEART RATE: 80 BPM | BODY MASS INDEX: 27.17 KG/M2 | SYSTOLIC BLOOD PRESSURE: 110 MMHG

## 2024-11-06 DIAGNOSIS — M25.512 LEFT SHOULDER PAIN, UNSPECIFIED CHRONICITY: Primary | ICD-10-CM

## 2024-11-06 DIAGNOSIS — M24.812 INTERNAL DERANGEMENT OF LEFT SHOULDER: ICD-10-CM

## 2024-11-06 DIAGNOSIS — M25.512 LEFT SHOULDER PAIN, UNSPECIFIED CHRONICITY: ICD-10-CM

## 2024-11-06 DIAGNOSIS — M25.512 CHRONIC LEFT SHOULDER PAIN: ICD-10-CM

## 2024-11-06 DIAGNOSIS — G89.29 CHRONIC LEFT SHOULDER PAIN: ICD-10-CM

## 2024-11-06 PROCEDURE — 73030 X-RAY EXAM OF SHOULDER: CPT

## 2024-11-06 PROCEDURE — 99214 OFFICE O/P EST MOD 30 MIN: CPT | Performed by: ORTHOPAEDIC SURGERY

## 2024-11-06 NOTE — PROGRESS NOTES
Patient Name:  Bar Amador  MRN:  [id number]    Assessment & Plan     1. Left shoulder pain, unspecified chronicity  -     XR shoulder 2+ vw left; Future; Expected date: 11/06/2024  2. Chronic left shoulder pain  -     Ambulatory Referral to Orthopedic Surgery  3. Internal derangement of left shoulder  -     MRI shoulder left wo contrast; Future; Expected date: 11/06/2024      Left shoulder internal derangement, concern for rotator cuff tear  X-rays reviewed in office today with patient  In depth conversation had with patient regarding weakness with rotator cuff testing, positive special testing, persistent pain after injury, will move forward with MRI of the Left shoulder to evaluate rotator cuff and cartilage surfaces.   In the meantime, would advise patient to perform home exercises including wall walks for stretching.  Patient requires sedation for MRIs and has appointment scheduled at outside facility for neck/back. Recommended obtaining at the same time secondary to sedation requirement for patient.   Continue pain medication as previously prescribed  Educated patient possibility of extraarticular etiology of pain including cervical radiculitis.   Follow up after MRI for discussion of nonoperative vs surgical management     Chief Complaint     Left shoulder pain    History of the Present Illness     Bar Amador is a RHD 63 y.o. male with Left shoulder pain ongoing for about 1-1.5 years. He admits to a fall directly onto the Left elbow causing shoulder pain. He admits to pain with overhead motions and increased function. Patient was doing tree service work for an occupation, but only worse as an  at this time. Patient has history of diabetes with most recent HGBA1c 6.8 performed 07/18/2024. Patient administers Oxycodone daily secondary to back and neck pain for approximately 35 years. He administers Right shoulder CSI in the past.     Review of Systems     Review of Systems   Constitutional:  " Negative for chills and fever.   HENT:  Negative for ear pain and sore throat.    Eyes:  Negative for pain and visual disturbance.   Respiratory:  Negative for cough and shortness of breath.    Cardiovascular:  Negative for chest pain and palpitations.   Gastrointestinal:  Negative for abdominal pain and vomiting.   Genitourinary:  Negative for dysuria and hematuria.   Musculoskeletal:  Negative for arthralgias and back pain.   Skin:  Negative for color change and rash.   Neurological:  Negative for seizures and syncope.   All other systems reviewed and are negative.      Physical Exam     /76   Pulse 80   Ht 6' 1\" (1.854 m)   Wt 93 kg (205 lb)   BMI 27.05 kg/m²     Left Shoulder:   Active range of motion   100 degrees forward flexion  90 degrees abduction  40 degrees external rotation   L4 internal rotation    Passive range of motion   100 degrees of forward flexion   There is no tenderness present over the shoulder.   Forward flexion testing 4/5  External rotation testing 4+/5  Internal rotation testing 4-/5  Zarate test is positive  The patient is neurovascularly intact distally in the extremity.    Cervical Spine:   Active range of motion limited with extension and bilateral rotation.    There is no midline tenderness.    There is no paraspinal hypertonicity and tenderness.    Sensation intact to light touch C5 through T1 dermatomes left upper extremity.   Sensation intact to light touch C5 through T1 dermatomes right upper extremity.    Left Motor: 5/5 biceps, 5/5 triceps, 5/5 wrist extension, 5/5 finger flexion, 5/5 finger abduction   Right Motor: 5/5 biceps, 5/5 triceps, 5/5 wrist extension, 5/5 finger flexion, 5/5 finger abduction   Spurling test is  negative      Eyes:  Anicteric sclerae.  Neck:  Supple.  Lungs:  Normal respiratory effort.  Cardiovascular:  Capillary refill is less than 2 seconds.  Skin:  Intact without erythema.  Neurologic:  Sensation grossly intact to light " touch.  Psychiatric:  Mood and affect are appropriate.    Data Review     I have personally reviewed pertinent films in PACS, and my interpretation follows:    X-rays taken 11/05/2024 of Left shoulder independently reviewed and demonstrate mild early glenohumeral osteoarthritis with well maintained joint spaces. No acute fracture or dislocation noted.      Past Medical History:   Diagnosis Date    Chronic pain disorder     neck and back    Colon polyp     Cough     Diabetes (HCC)     Diabetes mellitus (HCC)     Hyperlipidemia     Scoliosis        Past Surgical History:   Procedure Laterality Date    TOOTH EXTRACTION  06/2021       Allergies   Allergen Reactions    Penicillins Hives     rash and hives      Prednisone Dizziness     myalgia         Current Outpatient Medications on File Prior to Visit   Medication Sig Dispense Refill    ALPRAZolam (XANAX) 1 mg tablet take 1 tablet by mouth every morning and 2 tablets by mouth AT NIGHT if needed      Boswellia-Glucosamine-Vit D (OSTEO BI-FLEX ONE PER DAY PO) Take by mouth      Calcium-Vitamin D-Vitamin K (CALCIUM + D + K PO) Take 2,400 mg by mouth      colchicine (COLCRYS) 0.6 mg tablet Take 1.2 mg (2 tablets), then 1 hour later take 0.6 mg (1 tablet). Day 2, take 0.6 mg in the AM and PM for the next 3 days. 30 tablet 0    metFORMIN (GLUCOPHAGE-XR) 500 mg 24 hr tablet Take 2 tablets (1,000 mg total) by mouth daily with dinner 180 tablet 0    omeprazole (PriLOSEC) 20 mg delayed release capsule Take by mouth daily      oxyCODONE (ROXICODONE) 10 MG TABS take 2 tablets by mouth every 6 hours if needed for MODERATE TO S...  (REFER TO PRESCRIPTION NOTES).      oxyCODONE (ROXICODONE) 15 mg immediate release tablet Take 30 mg by mouth every 12 (twelve) hours as needed      oxyCODONE (ROXICODONE) 5 immediate release tablet take 1 tablet by mouth every 4 hours if needed for MODERATE TO SEVERE PAIN      sitaGLIPtin (JANUVIA) 50 mg tablet Take 1 tablet (50 mg total) by mouth daily  90 tablet 0    VITAMIN D PO Take by mouth      ibuprofen (MOTRIN) 600 mg tablet Take 600 mg by mouth every 6 (six) hours as needed (Patient not taking: Reported on 7/18/2024)       No current facility-administered medications on file prior to visit.       Social History     Tobacco Use    Smoking status: Never    Smokeless tobacco: Never   Vaping Use    Vaping status: Never Used   Substance Use Topics    Alcohol use: Not Currently     Comment: rarely    Drug use: Never       Family History   Problem Relation Age of Onset    Hypertension Mother     Hyperlipidemia Mother     Prostate cancer Father     Heart disease Father     Stroke Father     Diabetes Father     Cancer Father              Procedures Performed     Procedures  None       Sheree Masters PA-C

## 2024-11-08 ENCOUNTER — TELEPHONE (OUTPATIENT)
Age: 64
End: 2024-11-08

## 2024-11-08 NOTE — TELEPHONE ENCOUNTER
Caller: Patient    Doctor: Lizzie    Reason for call: Doctor ordered an MRI of the left shoulder and the patient is asking if he can go to [facility] for the MRI since he already has an appt there for a spine MRI. I told the patient he can go anywhere for the MRI, but he would need to get the results on a disk for the doctor to review.

## 2024-11-15 ENCOUNTER — TELEPHONE (OUTPATIENT)
Dept: UROLOGY | Facility: CLINIC | Age: 64
End: 2024-11-15

## 2024-11-15 ENCOUNTER — TELEPHONE (OUTPATIENT)
Age: 64
End: 2024-11-15

## 2024-11-15 NOTE — TELEPHONE ENCOUNTER
Caller: Wife-Pat    Doctor: Lizzie    Reason for call: Wife asking if the referral for the MRI of the Left shoulder can be faxed to the number below.      Attn: Jesu Scheduling  Fax: [phone]    Call back#: [phone]

## 2024-12-03 ENCOUNTER — OFFICE VISIT (OUTPATIENT)
Dept: FAMILY MEDICINE CLINIC | Facility: CLINIC | Age: 64
End: 2024-12-03
Payer: COMMERCIAL

## 2024-12-03 VITALS
BODY MASS INDEX: 28.36 KG/M2 | SYSTOLIC BLOOD PRESSURE: 110 MMHG | WEIGHT: 214 LBS | RESPIRATION RATE: 18 BRPM | OXYGEN SATURATION: 98 % | HEART RATE: 82 BPM | DIASTOLIC BLOOD PRESSURE: 76 MMHG | HEIGHT: 73 IN

## 2024-12-03 DIAGNOSIS — G89.29 CHRONIC LEFT SHOULDER PAIN: ICD-10-CM

## 2024-12-03 DIAGNOSIS — M48.00 SPINAL STENOSIS, UNSPECIFIED SPINAL REGION: ICD-10-CM

## 2024-12-03 DIAGNOSIS — M25.512 CHRONIC LEFT SHOULDER PAIN: ICD-10-CM

## 2024-12-03 DIAGNOSIS — Z01.818 PRE-OP EXAMINATION: Primary | ICD-10-CM

## 2024-12-03 DIAGNOSIS — Z00.00 ANNUAL PHYSICAL EXAM: ICD-10-CM

## 2024-12-03 DIAGNOSIS — E11.9 TYPE 2 DIABETES MELLITUS WITH HEMOGLOBIN A1C GOAL OF LESS THAN 7.0% (HCC): ICD-10-CM

## 2024-12-03 DIAGNOSIS — R09.82 PND (POST-NASAL DRIP): ICD-10-CM

## 2024-12-03 PROBLEM — E11.42 TYPE 2 DIABETES MELLITUS WITH DIABETIC POLYNEUROPATHY, WITHOUT LONG-TERM CURRENT USE OF INSULIN (HCC): Status: RESOLVED | Noted: 2023-04-18 | Resolved: 2024-12-03

## 2024-12-03 LAB
CREAT UR-MCNC: 146.8 MG/DL
MICROALBUMIN UR-MCNC: <7 MG/L
SL AMB POCT HEMOGLOBIN AIC: 7.3 (ref ?–6.5)

## 2024-12-03 PROCEDURE — 83036 HEMOGLOBIN GLYCOSYLATED A1C: CPT | Performed by: FAMILY MEDICINE

## 2024-12-03 PROCEDURE — 82570 ASSAY OF URINE CREATININE: CPT | Performed by: FAMILY MEDICINE

## 2024-12-03 PROCEDURE — 99214 OFFICE O/P EST MOD 30 MIN: CPT | Performed by: FAMILY MEDICINE

## 2024-12-03 PROCEDURE — 99396 PREV VISIT EST AGE 40-64: CPT | Performed by: FAMILY MEDICINE

## 2024-12-03 PROCEDURE — 82043 UR ALBUMIN QUANTITATIVE: CPT | Performed by: FAMILY MEDICINE

## 2024-12-03 RX ORDER — FLUTICASONE PROPIONATE 50 MCG
1 SPRAY, SUSPENSION (ML) NASAL DAILY
Qty: 16 ML | Refills: 0 | Status: SHIPPED | OUTPATIENT
Start: 2024-12-03

## 2024-12-03 RX ORDER — ATORVASTATIN CALCIUM 20 MG/1
20 TABLET, FILM COATED ORAL DAILY
Qty: 100 TABLET | Refills: 3 | Status: SHIPPED | OUTPATIENT
Start: 2024-12-03

## 2024-12-03 NOTE — PROGRESS NOTES
Pre-operative Clearance  Name: Bar Amador      : 1960      MRN: [id number]  Encounter Provider: Helen Allen DO  Encounter Date: 12/3/2024   Encounter department: [facility] [address]    Assessment & Plan  Type 2 diabetes mellitus with diabetic polyneuropathy, without long-term current use of insulin (HCC)    Lab Results   Component Value Date    HGBA1C 7.3 (A) 2024     Orders:    Albumin / creatinine urine ratio; Future    POCT hemoglobin A1c    atorvastatin (LIPITOR) 20 mg tablet; Take 1 tablet (20 mg total) by mouth daily    Comprehensive metabolic panel; Future    CBC and differential; Future    Lipid panel; Future    Pre-op examination         Chronic left shoulder pain         Pre-operative Clearance:     Revised Cardiac Risk Index:  RCI RISK CLASS I (0 risk factors, risk of major cardiac complications approximately 0.5%)    Clearance:  Patient is medically optimized (CLEARED) for proposed surgery without any additional cardiac testing.      Medication Instructions:   - Avoid herbs or non-directed vitamins one week prior to surgery    - Avoid aspirin containing medications or non-steroidal anti-inflammatory drugs one week preceding surgery    - May take tylenol for pain up until the night before surgery    - Benzodiazepines (ie, alprazolam, lorazepam, diazepam):  If the medication is needed, continue to take it on your normal schedule.  - Gout meds: Continue to take this medication on your normal schedule.  - Hyperlipidemia meds: Continue to take this medication on your normal schedule.  - Opioids: Continue to take this medication on your normal schedule.      Medicine Instructions for Adults with Diabetes (NO Bowel Prep)    Follow these instructions when a BOWEL PREP is NOT required for your procedure or surgery!    NOTE:  GLP Agonists taken weekly: do not take in the 7 days before your procedure. **Bariatric surgery: do not take 4 weeks prior to  your procedure.    SGLT-2 Inhibitors: do not take in the 4 days before your procedure    On the Day Before Surgery/Procedure  If you are having a procedure (e.g., Colonoscopy) or surgery which DOES NOT require a bowel prep, follow the directions below based on the type of medicine you take for your diabetes.  Type of Medicine You Take Examples What to Do   Pre-Mixed Insulin Intermediate  Hwqzeat47/25, Tuntszy93/30, Novolog 70/30, Regular Insulin Take 1/2 your regular dose the evening before our procedure.   Rapid/Fast Acting  Insulin and/or Long-Acting Insulin Humalog U200, NovoLog, Apidra,  Lantus, Levemir, Tresiba, Toujeo,  Fias, Basaglar Take your FULL regular dose the day before procedure.   Oral Diabetic Medicines (sulfonylurea) Glipizide/Glimepiride/  Glucotrol Take your regular dose with dinner the evening before your procedure.   Other Oral Diabetic Medicines Metformin, Glucophage, Glucophage  XR, Riomet, Glumetza, Actose,  Avandia, Gl set, Prandin Take your regular dose with dinner the evening before your procedure   GLP Agonists Adlyxin, Byetta, Bydureon,  Ozempic, Soliqua, Tanzeum,  Trulicity, Victoza, Saxenda,  Rybelsus, Wegovy, Mounjaro, Zepbound If taken daily, take as normal  If taken weekly, do not take this medicine for 7 days before your procedure including the day of the procedure (resume taking after the procedure). **Bariatric surgery: do not take 4 weeks prior to procedure   SGLT-2 Inhibitors Jardiance, Invokana, Farxiga, Steglatro, Brenzavvy, Qtern, Segluromet Glyxambi, Synjardy, Synjardy XR, Invokamet, InvokametXR, Trijary XR, Xigduo X Do not take for 4 days before your procedure including the day of the procedure (resume taking after the procedure)   This educational material has been approved by the Patient Education Advisory Committee.    On the Day of Surgery/Procedure  Follow the directions below based on the type of medicine you take for your diabetes.  Type of Medicine You Take   Examples What to Do   Long-Acting Insulin Lantus, Levemir, Tresiba,  Toujeo, Basaglar, Semglee If you normally take your Long Acting Insulin in the morning, take the full dose as scheduled.   GLP-I Agonists Adlyxin, Byetta, Bydureon,  Ozempic, Soliqua, Tanzeum,  Trulicity, Victoza, Saxenda,  Rybelsus, Mounjaro Do NOT take this medicine on the day of your procedure (resume taking after the procedure)   Except for the morning Long-Acting Insulin, DO NOT take ANY diabetic medicine on the day of your procedure unless you were instructed by the doctor who manages your diabetes medicines.  Continue to check your blood sugars.  If you have an insulin pump, ask your endocrinologist for instructions at least 3 days before your procedure. NOTE: If you are not able to ask your endocrinologist in advance, on the day of the procedure set your insulin pump to your basal rate only. Bring your insulin pump supplies to the hospital.        Depression Screening and Follow-up Plan: Patient was screened for depression during today's encounter. They screened negative with a PHQ-2 score of 0.      History of Present Illness     Pre-op Exam  Surgery: MRI L shoulder with sedation  Anticipated Date of Surgery: 12/13/24  Surgeon: Lizzie    Previous history of bleeding disorders or clots?: No  Previous Anesthesia reaction?: No  Prolonged steroid use in the last 6 months?: No    Assessment of Cardiac Risk:   - Unstable or severe angina or MI in the last 6 weeks or history of stent placement in the last year?: No   - Decompensated heart failure (e.g. New onset heart failure, NYHA  Class IV heart failure, or worsening existing heart failure)?: No  - Significant arrhythmias such as high grade AV block, symptomatic ventricular arrhythmia, newly recognized ventricular tachycardia, supraventricular tachycardia with resting heart rate >100, or symptomatic bradycardia?: No  - Severe heart valve disease including aortic stenosis or symptomatic mitral  stenosis?: No      Pre-operative Risk Factors:  Elevated-risk surgery: No    History of cerebrovascular disease: No    History of ischemic heart disease: No  Pre-operative treatment with insulin: No  Pre-operative creatinine >2 mg/dL: No    History of congestive heart failure: No    Duke Activity Status Index (DASI):   DASI Total Score: 36.7  METs: 7.3    Review of Systems  Past Medical History   Past Medical History:   Diagnosis Date    Chronic pain disorder     neck and back    Colon polyp     Cough     Diabetes (HCC)     Diabetes mellitus (HCC)     Hyperlipidemia     Scoliosis      Past Surgical History:   Procedure Laterality Date    TOOTH EXTRACTION  06/2021     Family History   Problem Relation Age of Onset    Hypertension Mother     Hyperlipidemia Mother     Prostate cancer Father     Heart disease Father     Stroke Father     Diabetes Father     Cancer Father      Social History     Tobacco Use    Smoking status: Never    Smokeless tobacco: Never   Vaping Use    Vaping status: Never Used   Substance and Sexual Activity    Alcohol use: Not Currently     Comment: rarely    Drug use: Never    Sexual activity: Yes     Partners: Female     Birth control/protection: Post-menopausal, None     Current Outpatient Medications on File Prior to Visit   Medication Sig    ALPRAZolam (XANAX) 1 mg tablet take 1 tablet by mouth every morning and 2 tablets by mouth AT NIGHT if needed    Boswellia-Glucosamine-Vit D (OSTEO BI-FLEX ONE PER DAY PO) Take by mouth    Calcium-Vitamin D-Vitamin K (CALCIUM + D + K PO) Take 2,400 mg by mouth    colchicine (COLCRYS) 0.6 mg tablet Take 1.2 mg (2 tablets), then 1 hour later take 0.6 mg (1 tablet). Day 2, take 0.6 mg in the AM and PM for the next 3 days.    metFORMIN (GLUCOPHAGE-XR) 500 mg 24 hr tablet Take 2 tablets (1,000 mg total) by mouth daily with dinner    omeprazole (PriLOSEC) 20 mg delayed release capsule Take by mouth daily    oxyCODONE (ROXICODONE) 10 MG TABS take 2 tablets by  "mouth every 6 hours if needed for MODERATE TO S...  (REFER TO PRESCRIPTION NOTES).    oxyCODONE (ROXICODONE) 15 mg immediate release tablet Take 30 mg by mouth every 12 (twelve) hours as needed    oxyCODONE (ROXICODONE) 5 immediate release tablet take 1 tablet by mouth every 4 hours if needed for MODERATE TO SEVERE PAIN    sitaGLIPtin (JANUVIA) 50 mg tablet Take 1 tablet (50 mg total) by mouth daily    VITAMIN D PO Take by mouth    ibuprofen (MOTRIN) 600 mg tablet Take 600 mg by mouth every 6 (six) hours as needed (Patient not taking: Reported on 7/18/2024)     Allergies   Allergen Reactions    Penicillins Hives     rash and hives      Prednisone Dizziness     myalgia       Objective   /76 (BP Location: Left arm, Patient Position: Sitting, Cuff Size: Large)   Pulse 82   Resp 18   Ht 6' 1\" (1.854 m)   Wt 97.1 kg (214 lb)   SpO2 98%   BMI 28.23 kg/m²     Physical Exam  Vitals reviewed.   Constitutional:       General: He is not in acute distress.     Appearance: Normal appearance.   HENT:      Head: Normocephalic and atraumatic.      Right Ear: External ear normal.      Left Ear: External ear normal.      Nose: Nose normal.      Mouth/Throat:      Mouth: Mucous membranes are moist.   Eyes:      Extraocular Movements: Extraocular movements intact.      Conjunctiva/sclera: Conjunctivae normal.   Cardiovascular:      Rate and Rhythm: Normal rate and regular rhythm.      Pulses: no weak pulses.           Dorsalis pedis pulses are 2+ on the right side and 2+ on the left side.        Posterior tibial pulses are 2+ on the right side and 2+ on the left side.      Heart sounds: Normal heart sounds.   Pulmonary:      Effort: Pulmonary effort is normal.      Breath sounds: Normal breath sounds. No wheezing, rhonchi or rales.   Abdominal:      General: Bowel sounds are normal. There is no distension.      Palpations: Abdomen is soft.      Tenderness: There is no abdominal tenderness.   Musculoskeletal:      Right " lower leg: No edema.      Left lower leg: No edema.   Feet:      Right foot:      Skin integrity: No ulcer, skin breakdown, erythema, warmth, callus or dry skin.      Left foot:      Skin integrity: No ulcer, skin breakdown, erythema, warmth, callus or dry skin.   Skin:     General: Skin is warm.      Capillary Refill: Capillary refill takes less than 2 seconds.      Findings: No rash.   Neurological:      Mental Status: He is alert. Mental status is at baseline.           Helen Brown, DO          Diabetic Foot Exam    Patient's shoes and socks removed.    Right Foot/Ankle   Right Foot Inspection  Skin Exam: skin normal and skin intact. No dry skin, no warmth, no callus, no erythema, no maceration, no abnormal color, no pre-ulcer, no ulcer and no callus.     Toe Exam: ROM and strength within normal limits.     Sensory   Vibration: absent  Proprioception: absent  Monofilament testing: absent    Vascular  Capillary refills: < 3 seconds  The right DP pulse is 2+. The right PT pulse is 2+.     Left Foot/Ankle  Left Foot Inspection  Skin Exam: skin normal and skin intact. No dry skin, no warmth, no erythema, no maceration, normal color, no pre-ulcer, no ulcer and no callus.     Toe Exam: ROM and strength within normal limits.     Sensory   Vibration: absent  Proprioception: absent  Monofilament testing: absent    Vascular  Capillary refills: < 3 seconds  The left DP pulse is 2+. The left PT pulse is 2+.     Assign Risk Category  No deformity present  Loss of protective sensation  No weak pulses  Risk: 1

## 2024-12-03 NOTE — ASSESSMENT & PLAN NOTE
Lab Results   Component Value Date    HGBA1C 7.3 (A) 12/03/2024       Orders:    Albumin / creatinine urine ratio; Future    POCT hemoglobin A1c    atorvastatin (LIPITOR) 20 mg tablet; Take 1 tablet (20 mg total) by mouth daily    Comprehensive metabolic panel; Future    CBC and differential; Future    Lipid panel; Future

## 2024-12-03 NOTE — PROGRESS NOTES
Adult Annual Physical  Name: Bar Amador      : 1960      MRN: [id number]  Encounter Provider: Helen Allen DO  Encounter Date: 12/3/2024   Encounter department: [facility] [address]    Assessment & Plan  Pre-op examination         Chronic left shoulder pain         Annual physical exam         Type 2 diabetes mellitus with hemoglobin A1c goal of less than 7.0% ([facility])    Lab Results   Component Value Date    HGBA1C 7.3 (A) 2024       Orders:    Albumin / creatinine urine ratio; Future    POCT hemoglobin A1c    atorvastatin (LIPITOR) 20 mg tablet; Take 1 tablet (20 mg total) by mouth daily    Comprehensive metabolic panel; Future    CBC and differential; Future    Lipid panel; Future    Spinal stenosis, unspecified spinal region         PND (post-nasal drip)    Orders:    fluticasone (FLONASE) 50 mcg/act nasal spray; 1 spray into each nostril daily    Immunizations and preventive care screenings were discussed with patient today. Appropriate education was printed on patient's after visit summary.    Discussed risks and benefits of prostate cancer screening. We discussed the controversial history of PSA screening for prostate cancer in the [place] as well as the risk of over detection and over treatment of prostate cancer by way of PSA screening.  The patient understands that PSA blood testing is an imperfect way to screen for prostate cancer and that elevated PSA levels in the blood may also be caused by infection, inflammation, prostatic trauma or manipulation, urological procedures, or by benign prostatic enlargement.    The role of the digital rectal examination in prostate cancer screening was also discussed and I discussed with him that there is large interobserver variability in the findings of digital rectal examination.    Counseling:  Alcohol/drug use: discussed moderation in alcohol intake, the recommendations for healthy alcohol use, and  "avoidance of illicit drug use.  Dental Health: discussed importance of regular tooth brushing, flossing, and dental visits.  Sexual health: discussed sexually transmitted diseases, partner selection, use of condoms, avoidance of unintended pregnancy, and contraceptive alternatives.  Exercise: the importance of regular exercise/physical activity was discussed. Recommend exercise 3-5 times per week for at least 30 minutes.       Depression Screening and Follow-up Plan: Patient was screened for depression during today's encounter. They screened negative with a PHQ-2 score of 0.      History of Present Illness     Adult Annual Physical:  Patient presents for annual physical.     Diet and Physical Activity:  - Diet/Nutrition: poor diet. on the road alot.  - Exercise: no formal exercise. active at work    Depression Screening:  - PHQ-2 Score: 0    General Health:  - Sleep: sleeps poorly.  - Hearing: normal hearing bilateral ears.  - Vision: goes for regular eye exams and most recent eye exam > 1 year ago.  - Dental: regular dental visits and brushes teeth twice daily.    /GYN Health:    - History of STDs: no     Health:  - History of STDs: no.     Review of Systems      Objective   /76 (BP Location: Left arm, Patient Position: Sitting, Cuff Size: Large)   Pulse 82   Resp 18   Ht 6' 1\" (1.854 m)   Wt 97.1 kg (214 lb)   SpO2 98%   BMI 28.23 kg/m²     Physical Exam  Vitals reviewed.   Constitutional:       General: He is not in acute distress.     Appearance: Normal appearance.   HENT:      Head: Normocephalic and atraumatic.      Right Ear: External ear normal.      Left Ear: External ear normal.      Nose: Nose normal.      Mouth/Throat:      Mouth: Mucous membranes are moist.   Eyes:      Extraocular Movements: Extraocular movements intact.      Conjunctiva/sclera: Conjunctivae normal.      Pupils: Pupils are equal, round, and reactive to light.   Cardiovascular:      Rate and Rhythm: Normal rate and " regular rhythm.      Pulses: no weak pulses.           Dorsalis pedis pulses are 2+ on the right side and 2+ on the left side.        Posterior tibial pulses are 2+ on the right side and 2+ on the left side.      Heart sounds: Normal heart sounds.   Pulmonary:      Effort: Pulmonary effort is normal.      Breath sounds: Normal breath sounds. No wheezing, rhonchi or rales.   Abdominal:      General: Bowel sounds are normal. There is no distension.      Palpations: Abdomen is soft.      Tenderness: There is no abdominal tenderness.   Musculoskeletal:      Cervical back: Neck supple.      Right lower leg: No edema.      Left lower leg: No edema.   Feet:      Right foot:      Skin integrity: No ulcer, skin breakdown, erythema, warmth, callus or dry skin.      Left foot:      Skin integrity: No ulcer, skin breakdown, erythema, warmth, callus or dry skin.   Lymphadenopathy:      Cervical: No cervical adenopathy.   Skin:     General: Skin is warm.      Capillary Refill: Capillary refill takes less than 2 seconds.      Findings: No rash.   Neurological:      Mental Status: He is alert. Mental status is at baseline.           Diabetic Foot Exam    Patient's shoes and socks removed.    Right Foot/Ankle   Right Foot Inspection  Skin Exam: skin normal. No dry skin, no warmth, no callus, no erythema, no maceration, no abnormal color, no pre-ulcer, no ulcer and no callus.     Toe Exam: ROM and strength within normal limits.     Sensory   Vibration: absent  Proprioception: absent  Monofilament testing: absent    Vascular  Capillary refills: < 3 seconds  The right DP pulse is 2+. The right PT pulse is 2+.     Left Foot/Ankle  Left Foot Inspection  Skin Exam: skin normal. No dry skin, no warmth, no erythema, no maceration, normal color, no pre-ulcer, no ulcer and no callus.     Toe Exam: ROM and strength within normal limits.     Sensory   Vibration: absent  Proprioception: absent  Monofilament testing: absent    Vascular  Capillary  refills: < 3 seconds  The left DP pulse is 2+. The left PT pulse is 2+.     Assign Risk Category  No deformity present  Loss of protective sensation  No weak pulses  Risk: 1

## 2024-12-04 ENCOUNTER — TELEPHONE (OUTPATIENT)
Dept: OBGYN CLINIC | Facility: HOSPITAL | Age: 64
End: 2024-12-04

## 2024-12-04 NOTE — TELEPHONE ENCOUNTER
Caller: Ashley - [facility]    Doctor: Lizzie    Reason for call: Ashley is calling to find out if Bar's MRI of the lt shoulder has been authorized. Please call Ashley at [facility]. Thank you.    Call back#: [phone]

## 2024-12-17 ENCOUNTER — OFFICE VISIT (OUTPATIENT)
Dept: FAMILY MEDICINE CLINIC | Facility: CLINIC | Age: 64
End: 2024-12-17
Payer: COMMERCIAL

## 2024-12-17 VITALS
BODY MASS INDEX: 28.57 KG/M2 | HEIGHT: 73 IN | OXYGEN SATURATION: 99 % | SYSTOLIC BLOOD PRESSURE: 126 MMHG | HEART RATE: 92 BPM | DIASTOLIC BLOOD PRESSURE: 74 MMHG | TEMPERATURE: 98.6 F | WEIGHT: 215.6 LBS

## 2024-12-17 DIAGNOSIS — E11.9 TYPE 2 DIABETES MELLITUS WITH HEMOGLOBIN A1C GOAL OF LESS THAN 7.0% (HCC): ICD-10-CM

## 2024-12-17 DIAGNOSIS — R05.1 ACUTE COUGH: ICD-10-CM

## 2024-12-17 DIAGNOSIS — R09.82 PND (POST-NASAL DRIP): ICD-10-CM

## 2024-12-17 DIAGNOSIS — R13.11 ORAL PHASE DYSPHAGIA: Primary | ICD-10-CM

## 2024-12-17 PROCEDURE — 99214 OFFICE O/P EST MOD 30 MIN: CPT | Performed by: FAMILY MEDICINE

## 2024-12-17 RX ORDER — FLUTICASONE PROPIONATE 50 MCG
1 SPRAY, SUSPENSION (ML) NASAL DAILY
Qty: 16 ML | Refills: 0 | Status: SHIPPED | OUTPATIENT
Start: 2024-12-17

## 2024-12-17 NOTE — PROGRESS NOTES
Name: Bar Amador      : 1960      MRN: [id number]  Encounter Provider: MIREYA Bernardo  Encounter Date: 2024   Encounter department: [facility] [address]  :  Assessment & Plan  Oral phase dysphagia  Discussed potentials with patient, recommendations made, made ENT complete eval  Orders:    Ambulatory Referral to Otolaryngology; Future    Acute cough  Discussed  potential causative factors discussed potentials with patient, chest x-ray  Orders:    XR chest pa and lateral; Future    PND (post-nasal drip)    Orders:    fluticasone (FLONASE) 50 mcg/act nasal spray; 1 spray into each nostril daily    Type 2 diabetes mellitus with hemoglobin A1c goal of less than 7.0% (HCC)  Above goal continue current care per primary  We discussed the importance of controlling blood glucose (hemoglobin A1c less than 7.0)Premeal , even better <110  2hr after a meal <170, even better <140  A1C <7%, even better <6.5%.  blood pressure control, and cholesterol to lower the risk for complications. Encouraged advise to check home blood sugars discussed goals in range of therapy. Reviewed recommendations of annual eye exams (ophthalmology) on long foot exam (Podiatry)  Lab Results   Component Value Date    HGBA1C 7.3 (A) 2024                   History of Present Illness     C/o irritation to throat   Persistent cough   Seen by pcp , given nasal spray as of yet not trialed   Persistent post nasal drip with cough   Changing in colors at time , last week cough up blood , clearing throat   Denies fever chills negative weight changes  Negative travel negative ill contacts  Has had previous eval for similar complaints ct , mri neg results   Feels the need for ENT to visually eval throat   Hx multiple nasal fractures , corrective surgery       Review of Systems   Constitutional:  Negative for appetite change, chills, fever and unexpected weight change.   HENT:  Negative for  "congestion, dental problem, ear pain, hearing loss, postnasal drip, rhinorrhea, sinus pressure, sinus pain, sneezing, sore throat, tinnitus and voice change.    Eyes:  Negative for visual disturbance.   Respiratory:  Negative for apnea, cough, chest tightness and shortness of breath.    Cardiovascular:  Negative for chest pain, palpitations and leg swelling.   Gastrointestinal:  Negative for abdominal pain, blood in stool, constipation, diarrhea, nausea and vomiting.   Endocrine: Negative for cold intolerance, heat intolerance, polydipsia, polyphagia and polyuria.   Genitourinary:  Negative for decreased urine volume, difficulty urinating, dysuria, frequency and hematuria.   Musculoskeletal:  Negative for arthralgias, back pain, gait problem, joint swelling and myalgias.   Skin:  Negative for color change, rash and wound.   Allergic/Immunologic: Negative for environmental allergies and food allergies.   Neurological:  Negative for dizziness, syncope, weakness, light-headedness, numbness and headaches.   Hematological:  Negative for adenopathy. Does not bruise/bleed easily.   Psychiatric/Behavioral:  Negative for sleep disturbance and suicidal ideas. The patient is not nervous/anxious.        Objective   /74 (BP Location: Left arm, Patient Position: Sitting)   Pulse 92   Temp 98.6 °F (37 °C)   Ht 6' 1\" (1.854 m)   Wt 97.8 kg (215 lb 9.6 oz)   SpO2 99%   BMI 28.44 kg/m²      Physical Exam  Constitutional:       General: He is not in acute distress.     Appearance: He is well-developed. He is not ill-appearing or toxic-appearing.   HENT:      Head: Normocephalic and atraumatic.      Right Ear: Tympanic membrane normal.      Left Ear: Tympanic membrane normal.      Nose: Nose normal.      Mouth/Throat:      Mouth: Mucous membranes are moist.   Eyes:      Conjunctiva/sclera: Conjunctivae normal.   Cardiovascular:      Rate and Rhythm: Normal rate and regular rhythm.      Pulses: Normal pulses.      Heart " sounds: Normal heart sounds.   Pulmonary:      Effort: Pulmonary effort is normal. No respiratory distress.      Breath sounds: Normal breath sounds. No wheezing or rales.   Musculoskeletal:         General: Normal range of motion.      Cervical back: Normal range of motion and neck supple. No rigidity.      Right lower leg: No edema.      Left lower leg: No edema.   Lymphadenopathy:      Cervical: No cervical adenopathy.   Skin:     General: Skin is warm and dry.      Findings: No lesion or rash.   Neurological:      General: No focal deficit present.      Mental Status: He is alert and oriented to person, place, and time.      Deep Tendon Reflexes: Reflexes are normal and symmetric.   Psychiatric:         Behavior: Behavior normal.         Thought Content: Thought content normal.         Judgment: Judgment normal.

## 2024-12-17 NOTE — ASSESSMENT & PLAN NOTE
Above goal continue current care per primary  We discussed the importance of controlling blood glucose (hemoglobin A1c less than 7.0)Premeal , even better <110  2hr after a meal <170, even better <140  A1C <7%, even better <6.5%.  blood pressure control, and cholesterol to lower the risk for complications. Encouraged advise to check home blood sugars discussed goals in range of therapy. Reviewed recommendations of annual eye exams (ophthalmology) on long foot exam (Podiatry)  Lab Results   Component Value Date    HGBA1C 7.3 (A) 12/03/2024

## 2024-12-19 ENCOUNTER — DOCUMENTATION (OUTPATIENT)
Dept: OBGYN CLINIC | Facility: CLINIC | Age: 64
End: 2024-12-19

## 2025-01-12 DIAGNOSIS — E11.42 TYPE 2 DIABETES MELLITUS WITH DIABETIC POLYNEUROPATHY, WITHOUT LONG-TERM CURRENT USE OF INSULIN (HCC): ICD-10-CM

## 2025-01-13 RX ORDER — METFORMIN HYDROCHLORIDE 500 MG/1
1000 TABLET, EXTENDED RELEASE ORAL
Qty: 180 TABLET | Refills: 1 | Status: SHIPPED | OUTPATIENT
Start: 2025-01-13

## 2025-01-14 ENCOUNTER — TELEPHONE (OUTPATIENT)
Dept: GASTROENTEROLOGY | Facility: CLINIC | Age: 65
End: 2025-01-14

## 2025-02-26 ENCOUNTER — OFFICE VISIT (OUTPATIENT)
Dept: OBGYN CLINIC | Facility: CLINIC | Age: 65
End: 2025-02-26
Payer: COMMERCIAL

## 2025-02-26 VITALS — WEIGHT: 205 LBS | HEIGHT: 73 IN | BODY MASS INDEX: 27.17 KG/M2 | RESPIRATION RATE: 18 BRPM

## 2025-02-26 DIAGNOSIS — M54.2 NECK PAIN: ICD-10-CM

## 2025-02-26 DIAGNOSIS — G89.29 CHRONIC LEFT SHOULDER PAIN: Primary | ICD-10-CM

## 2025-02-26 DIAGNOSIS — M75.102 TEAR OF LEFT ROTATOR CUFF, UNSPECIFIED TEAR EXTENT, UNSPECIFIED WHETHER TRAUMATIC: ICD-10-CM

## 2025-02-26 DIAGNOSIS — M75.02 ADHESIVE CAPSULITIS OF LEFT SHOULDER: ICD-10-CM

## 2025-02-26 DIAGNOSIS — M25.512 CHRONIC LEFT SHOULDER PAIN: Primary | ICD-10-CM

## 2025-02-26 PROCEDURE — 99213 OFFICE O/P EST LOW 20 MIN: CPT | Performed by: ORTHOPAEDIC SURGERY

## 2025-02-26 RX ORDER — ALPRAZOLAM 0.5 MG
TABLET ORAL
COMMUNITY
Start: 2025-01-16

## 2025-02-26 NOTE — PROGRESS NOTES
Name: Bar Amador      : 1960      MRN: [id number]  Encounter Provider: Renard Samuel DO  Encounter Date: 2025   Encounter department: [facility] [place]  :  Assessment & Plan  Chronic left shoulder pain         Tear of left rotator cuff, unspecified tear extent, unspecified whether traumatic    Orders:    Ambulatory Referral to Physical Therapy; Future    Adhesive capsulitis of left shoulder    Orders:    Ambulatory Referral to Physical Therapy; Future    Neck pain    Orders:    Ambulatory referral to Spine & Pain Management; Future      Left shoulder near full thickness supraspinatus tear, only MRI report available, adhesive capsulitis; concomitant cervical stenosis   MRI report read and reviewed previous x-rays  In depth conversation had with patient regarding recommended nonoperative management including outpatient PT to work on passive and active motion, possible CSI, OTC topical and oral anaglesics  In light of patient's concomitant cervical spine pain and possible radicular symptoms, would recommend against CSI as this would unlikely provide significant pain relief. Placed referral to Pain management for continued evaluation of cervical spine and consideration of injection therapy  Advised patient if symptoms of shoulder pain persist or worsen, would consider injection therapy. Would avoid surgical management at this time and if patient still displayed shoulder stiffness as surgical intervention would exacerbate stiffness. Strongly recommended patient to start outpatient PT to work on range of motion.   Follow up 3-4 months and reminded patient to bring MRI disc for review and next appointment, consideration CSI at that time     History of the Present Illness   History of Present Illness   HPI   Bar Amador is a 64 y.o. male with Left shoulder internal derangement. Today, patient reports he is feeling okay as he had a good nights sleep. He admits  "to pain in the back of the Left shoulder with range of motion and overhead motions. He takes \"170mg of Oxycodone a day\".         Review of Systems     Review of Systems   Constitutional:  Negative for chills and fever.   HENT:  Negative for ear pain and sore throat.    Eyes:  Negative for pain and visual disturbance.   Respiratory:  Negative for cough and shortness of breath.    Cardiovascular:  Negative for chest pain and palpitations.   Gastrointestinal:  Negative for abdominal pain and vomiting.   Genitourinary:  Negative for dysuria and hematuria.   Musculoskeletal:  Negative for arthralgias and back pain.   Skin:  Negative for color change and rash.   Neurological:  Negative for seizures and syncope.   All other systems reviewed and are negative.      Physical Exam   Objective   Resp 18   Ht 6' 1\" (1.854 m)   Wt 93 kg (205 lb)   BMI 27.05 kg/m²        Left Shoulder:   Active range of motion   100-105 degrees forward flexion  110 degrees abduction  20 degrees external rotation   L4 internal rotation    Passive range of motion   110 degrees of forward flexion   Forward flexion testing 4/5  External rotation testing 5/5  Internal rotation testing 5/5    The patient is neurovascularly intact distally in the extremity.      Data Review     I have personally reviewed pertinent films in PACS, and my interpretation follows.    X-rays taken 11/05/2024 of Left shoulder independently reviewed and demonstrate mild early glenohumeral osteoarthritis with well maintained joint spaces. No acute fracture or dislocation noted.     MRI REPORT ONLY Left shoulder: Near full thickness to full thickness tear of supraspinatus tendon and mild to moderate osteoarthritis at the shoulder.    MRI REPORT ONLY cervical spine: multilevel, multifactorial degenerative changes of the cervical spine with varying degrees of spinal canal and neural foraminal stenosis, most prominent C3-4 and C4-5. Moderate to severe spinal canal stenosis at C3-4 " and C4-5    Social History     Tobacco Use    Smoking status: Never    Smokeless tobacco: Never   Vaping Use    Vaping status: Never Used   Substance Use Topics    Alcohol use: Not Currently     Comment: rarely    Drug use: Never           Procedures  None     Sheree Masters PA-C

## 2025-03-04 ENCOUNTER — TELEPHONE (OUTPATIENT)
Age: 65
End: 2025-03-04

## 2025-03-04 DIAGNOSIS — E11.9 TYPE 2 DIABETES MELLITUS WITH HEMOGLOBIN A1C GOAL OF LESS THAN 7.0% (HCC): Primary | ICD-10-CM

## 2025-03-04 RX ORDER — BLOOD-GLUCOSE METER
KIT MISCELLANEOUS
Qty: 1 KIT | Refills: 0 | Status: SHIPPED | OUTPATIENT
Start: 2025-03-04

## 2025-03-04 RX ORDER — LANCETS 33 GAUGE
EACH MISCELLANEOUS
Qty: 100 EACH | Refills: 3 | Status: SHIPPED | OUTPATIENT
Start: 2025-03-04

## 2025-03-04 RX ORDER — BLOOD SUGAR DIAGNOSTIC
STRIP MISCELLANEOUS
Qty: 100 EACH | Refills: 3 | Status: SHIPPED | OUTPATIENT
Start: 2025-03-04

## 2025-03-04 NOTE — TELEPHONE ENCOUNTER
Patient wants to start checking his sugars again and found that his test strips and lancets are all .  Please order test strips and lancets for a One Touch Ultra Blue meter.  Please let him know when they have been ordered.  Thank you.

## 2025-03-07 ENCOUNTER — TELEPHONE (OUTPATIENT)
Age: 65
End: 2025-03-07

## 2025-03-07 NOTE — TELEPHONE ENCOUNTER
Patients GI provider:  Dr. Limon    Number to return call: [phone]    Reason for call: Pt calling khris he received calls and letter re: colonoscopy. Pt requesting no more calls re: this please. Pt requested to schedule consult in 3 mths w/ Dr. Limon due to having other issues w/ ENT and wants to take care of that first. Pt is still having issues w/ his esophagus. Thank you.    Scheduled procedure/appointment date if applicable: Apt 6/6/25

## 2025-03-07 NOTE — TELEPHONE ENCOUNTER
Caller: Patient     Doctor: Dr. Samuel     Reason for call: Patient wanted to let the doctor know that he requested the MRI images be shared from [facility]. The MRIs are available for review in Epic now.    Call back#: [phone]

## 2025-04-01 ENCOUNTER — CONSULT (OUTPATIENT)
Dept: PAIN MEDICINE | Facility: CLINIC | Age: 65
End: 2025-04-01
Payer: COMMERCIAL

## 2025-04-01 VITALS — WEIGHT: 212 LBS | BODY MASS INDEX: 28.1 KG/M2 | HEIGHT: 73 IN

## 2025-04-01 DIAGNOSIS — M47.812 CERVICAL SPONDYLOSIS: ICD-10-CM

## 2025-04-01 DIAGNOSIS — M54.12 CERVICAL RADICULOPATHY: Primary | ICD-10-CM

## 2025-04-01 DIAGNOSIS — E11.9 TYPE 2 DIABETES MELLITUS WITH HEMOGLOBIN A1C GOAL OF LESS THAN 7.0% (HCC): ICD-10-CM

## 2025-04-01 DIAGNOSIS — M54.2 NECK PAIN: ICD-10-CM

## 2025-04-01 DIAGNOSIS — M48.02 CERVICAL SPINAL STENOSIS: ICD-10-CM

## 2025-04-01 PROCEDURE — 99244 OFF/OP CNSLTJ NEW/EST MOD 40: CPT | Performed by: PAIN MEDICINE

## 2025-04-01 NOTE — PROGRESS NOTES
Assessment  1. Cervical radiculopathy  -     Ambulatory referral to Orthopedic Surgery; Future  2. Neck pain  -     Ambulatory referral to Spine & Pain Management  -     Ambulatory referral to Orthopedic Surgery; Future  3. Cervical spondylosis  -     Ambulatory referral to Orthopedic Surgery; Future  -     FL spine and pain procedure; Future; Expected date: 04/01/2025  4. Cervical spinal stenosis  -     Ambulatory referral to Orthopedic Surgery; Future  5. Type 2 diabetes mellitus with hemoglobin A1c goal of less than 7.0% ([facility])        Atilio is a pleasant 64-year male with history of chronic neck pain bilaterally secondary to cervical spondylosis cervical spinal stenosis worse at C3-4 and 4 5 based on interpretation of his MRI.  His C3 445 level moderate to severely stenosed he has radiation to bilateral shoulders which is in the distribution of the C3 and 4 V nerve.  He also has mild foraminal stenosis on left side with radiation into his left upper extremity this is not as significant as his bilateral shoulder pain that he feels radiates from his neck.    His pain is combination of both axial and radicular pain symptoms.  So his pain is appears to be axial in nature this could be in the setting of his spinal stenosis and cervical spondylosis.  Will recommend a left C3-4-5 medial branch block and fluoroscopic guidance if successful with degenerative blocks we will proceed with nerve ablation.  However given his spinal stenosis worse at C3-4 and 4 5 and his gait instability and subsequent falls will recommend referral to .        My impressions and treatment recommendations were discussed in detail with the patient who verbalized understanding and had no further questions.  Discharge instructions were provided. I personally saw and examined the patient and I agree with the above discussed plan of care.    Plan      No orders of the defined types were placed in this encounter.      Orders Placed This  Encounter   Procedures   • FL spine and pain procedure     Standing Status:   Future     Expected Date:   4/1/2025     Expiration Date:   4/1/2029     Reason for Exam::   left C345 MBB # 1     Anticoagulant hold needed?:   none   • Ambulatory referral to Orthopedic Surgery     Standing Status:   Future     Expiration Date:   4/1/2026     Referral Priority:   Routine     Referral Type:   Consult - AMB     Referral Reason:   Specialty Services Required     Referred to Provider:   Christi Martinez MD     Requested Specialty:   Orthopedic Surgery     Number of Visits Requested:   1     Expiration Date:   4/1/2026       History of Present Illness    Bar Amador is a 64 y.o. male with relevant PMH of long-term opiate use currently on oxycodone 10 mg he takes 4 tablets to 5 tablets in the morning afternoon and evening.  He been having chronic chronic pain in his neck ongoing for the past 30 years he reports that he has bilateral shoulder pain neck pain that radiates into his left shoulders bilaterally pain with turning the neck to the left and right he does endorse gait instability along with pain radiating to his left upper extremity into his left forearm.  This pain is not as severe as his neck pain that radiates into his bilateral shoulder blades and shoulders.    Seen by Dr. Samuel who recommended against doing a shoulder injection given his concurrent symptoms of neck pain and upper extremity radicular pain.    I have personally reviewed and/or updated the patient's past medical history, past surgical history, family history, social history, current medications, allergies, and vital signs today.     Review of Systems   Musculoskeletal:  Positive for gait problem, myalgias, neck pain and neck stiffness.       Patient Active Problem List   Diagnosis   • Type 2 diabetes mellitus with hemoglobin A1c goal of less than 7.0% ([facility])   • Continuous opioid dependence (HCC)   • Spinal stenosis   • Elevated PSA, less than 10  ng/ml   • Sepsis without acute organ dysfunction (HCC)   • H/O prostate biopsy   • GIB (gastrointestinal bleeding)       Past Medical History:   Diagnosis Date   • Chronic pain disorder     neck and back   • Colon polyp    • Cough    • Diabetes (HCC)    • Diabetes mellitus (HCC)    • HL (hearing loss)    • Hyperlipidemia    • Scoliosis    • Sleep difficulties    • Tinnitus        Past Surgical History:   Procedure Laterality Date   • NOSE SURGERY     • TOOTH EXTRACTION  06/2021       Family History   Problem Relation Age of Onset   • Hypertension Mother    • Hyperlipidemia Mother    • Prostate cancer Father    • Heart disease Father    • Stroke Father    • Diabetes Father    • Cancer Father        Social History     Occupational History   • Occupation: EMPLOYED   Tobacco Use   • Smoking status: Never   • Smokeless tobacco: Never   Vaping Use   • Vaping status: Never Used   Substance and Sexual Activity   • Alcohol use: Not Currently     Comment: rarely   • Drug use: Never   • Sexual activity: Yes     Partners: Female     Birth control/protection: Post-menopausal, None       Current Outpatient Medications on File Prior to Visit   Medication Sig   • ALPRAZolam (XANAX) 0.5 mg tablet TAKE 1 TO 1 AND 1/2 TABLETS BY MOUTH IN THE MORNING AND TAKE  1 A...  (REFER TO PRESCRIPTION NOTES).   • Blood Glucose Monitoring Suppl (OneTouch Verio Reflect) w/Device KIT Check blood sugars once daily. Please substitute with appropriate alternative as covered by patient's insurance. Dx: E11.65   • Boswellia-Glucosamine-Vit D (OSTEO BI-FLEX ONE PER DAY PO) Take by mouth   • Calcium-Vitamin D-Vitamin K (CALCIUM + D + K PO) Take 2,400 mg by mouth   • famotidine (PEPCID) 40 MG tablet Take 1 tablet (40 mg total) by mouth daily at bedtime   • glucose blood (OneTouch Verio) test strip Check blood sugars once daily. Please substitute with appropriate alternative as covered by patient's insurance. Dx: E11.65   • metFORMIN (GLUCOPHAGE-XR) 500 mg  "24 hr tablet Take 2 tablets (1,000 mg total) by mouth daily with dinner   • omeprazole (PriLOSEC) 40 MG capsule Take 1 capsule (40 mg total) by mouth daily before breakfast   • OneTouch Delica Lancets 33G MISC Check blood sugars once daily. Please substitute with appropriate alternative as covered by patient's insurance. Dx: E11.65   • oxyCODONE (ROXICODONE) 10 MG TABS take 2 tablets by mouth every 6 hours if needed for MODERATE TO S...  (REFER TO PRESCRIPTION NOTES).   • oxyCODONE (ROXICODONE) 15 mg immediate release tablet Take 30 mg by mouth every 12 (twelve) hours as needed   • oxyCODONE (ROXICODONE) 5 immediate release tablet    • sitaGLIPtin (JANUVIA) 50 mg tablet Take 1 tablet (50 mg total) by mouth daily   • VITAMIN D PO Take by mouth   • ALPRAZolam (XANAX) 1 mg tablet take 1 tablet by mouth every morning and 2 tablets by mouth AT NIGHT if needed (Patient not taking: Reported on 2/26/2025)   • atorvastatin (LIPITOR) 20 mg tablet Take 1 tablet (20 mg total) by mouth daily (Patient not taking: Reported on 2/26/2025)   • colchicine (COLCRYS) 0.6 mg tablet Take 1.2 mg (2 tablets), then 1 hour later take 0.6 mg (1 tablet). Day 2, take 0.6 mg in the AM and PM for the next 3 days. (Patient not taking: Reported on 2/26/2025)   • fluticasone (FLONASE) 50 mcg/act nasal spray 1 spray into each nostril daily (Patient not taking: Reported on 2/26/2025)   • ibuprofen (MOTRIN) 600 mg tablet Take 600 mg by mouth every 6 (six) hours as needed (Patient not taking: Reported on 7/18/2024)     No current facility-administered medications on file prior to visit.       Allergies   Allergen Reactions   • Penicillins Hives     rash and hives     • Prednisone Dizziness     myalgia           Physical Exam    Ht 6' 1\" (1.854 m)   Wt 96.2 kg (212 lb)   BMI 27.97 kg/m²         MSK:      Cervical Spine:  No masses or atrophy, TTP cervical facets bilaterally, L > R  Range of motion - decreased to the left > right  Spurling's - " negattive    Strength Right Left   Elbow flexion/deltoid (C5) 5 5   Wrist extension (c6) 5 5   Elbow /finger extension (c7) 5 5   Finger flexion (c8) 5 5   Intrinsics (t1) 5 5        Reflexes:     Right Left   Biceps 1+ 1+   Triceps 1+ 1+   Brachioradialis 1+ 1+   Patellar 2+ 2+   Achilles 2+ 2+   Babinski neg neg        Sensory Exam: Full and equal sensation to light touch throughout bilateral UE  Reflexes: Ruby negative Bilaterally; Clonus negative bilaterally      Gait antalgic          Imaging    MRI C spine  1. Multilevel, multifactorial degenerative changes of the cervical spine with varying degrees of spinal canal and neural foraminal stenosis as described, most prominent at C3-C4 and C4-C5.  2. Moderate to severe spinal canal stenosis at C3-C4 and C4-C5.  3. Additional findings as described above.  Narrative    EXAM  MRI C SPINE WO [id number]/13/2024 10:13 am    HISTORY  Worsening neck and left UE pain, limitation of cervical ROM; Neck pain; Neck pain, no complicating feature.    COMPARISON  Comparison is made with the prior MRI dated 06/18/2014.    TECHNIQUE  Routine cervical protocol MRI was performed without the administration of IV gadolinium.    FINDINGS  There is straightening of the normal cervical lordosis.  There is multilevel disc desiccation.  There is intervertebral disc space narrowing, endplate osteophyte formation, and endplate degenerative changes at C3-C4, C4-C5, C5-6, and C6-7.  There is multilevel facet hypertrophy of the cervical spine (right greater than left).  The vertebral body heights are grossly maintained.  The prevertebral soft tissues are within normal limits.  No acute cervical fracture is identified.    The inferior cerebellum is unremarkable.  No definitive spinal cord lesion is identified.    There is fluid within the nasopharynx and oropharynx.    At the level of C2-C3, there is asymmetric right facet arthropathy contributing to mild right neural foraminal stenosis  without significant spinal canal stenosis.  At the level of C3-C4, there is a posterior disc osteophyte complex uncovertebral spurring and facet hypertrophy causing moderate to severe spinal canal stenosis, ventral and posterior indentation of the spinal cord, severe right neural foraminal stenosis, and moderate to severe left neural foraminal stenosis.  At the level of C4-C5, there is a posterior disc osteophyte complex with uncovertebral spurring and facet hypertrophy causing moderate to severe spinal canal stenosis, ventral and posterior indentation of the spinal cord, and severe bilateral neural foraminal stenosis.  At the level of C5-C6, there is an asymmetric posterior disc osteophyte complex with uncovertebral spurring causing mild spinal canal stenosis, ventral indentation of the spinal cord, and mild to moderate bilateral neural foraminal stenosis.  At the level of C6-C7, there is a posterior disc osteophyte complex with uncovertebral spurring causing mild ventral effacement of the CSF space, ventral flattening of the spinal cord and mild bilateral neural foraminal stenosis.  At the level of C7-T1, there is minimal ventral effacement of the CSF space and mild left neural foraminal stenosis.

## 2025-04-14 ENCOUNTER — OFFICE VISIT (OUTPATIENT)
Age: 65
End: 2025-04-14
Payer: COMMERCIAL

## 2025-04-14 VITALS — WEIGHT: 212 LBS | HEIGHT: 73 IN | BODY MASS INDEX: 28.1 KG/M2

## 2025-04-14 DIAGNOSIS — M54.2 NECK PAIN: Primary | ICD-10-CM

## 2025-04-14 DIAGNOSIS — M41.25 OTHER IDIOPATHIC SCOLIOSIS, THORACOLUMBAR REGION: ICD-10-CM

## 2025-04-14 DIAGNOSIS — M54.12 RADICULOPATHY, CERVICAL REGION: ICD-10-CM

## 2025-04-14 DIAGNOSIS — M47.812 CERVICAL SPONDYLOSIS: ICD-10-CM

## 2025-04-14 PROCEDURE — 99214 OFFICE O/P EST MOD 30 MIN: CPT | Performed by: ORTHOPAEDIC SURGERY

## 2025-04-14 NOTE — PROGRESS NOTES
"Name: Bar Amador      : 1960       MRN: [id number]   Encounter Provider: Christi Martinez MD   Encounter Date: 25  Encounter department: [facility]   [facility] ORTHOPEDIC SPINE SURGERY      History of Present Illness    Bar Amador is a 64 y.o. male who presents for initial evaluation of his cervical spine. Pain has been present for multiple years, with progressive worsening of symptom severity over time.  The patient reports that their pain is mostly located at the posterior aspect of his neck, with occasional radiation to both upper extremities. Additionally, he endorses chronic low back pain, which has been present for multiple years. Denies radiation of pain into either lower extremity. Denie radicular symptoms into either lower extremity. Endorses progressive balance issues over the last 2 years. Denies recent land-based or aqua-based PT. Denies history of spinal injections with pain management. Denies prior history of spine surgery.    Diabetic: Yes   Nicotine use: No  BMI: Estimated body mass index is 27.97 kg/m² as calculated from the following:    Height as of this encounter: 6' 1\" (1.854 m).    Weight as of this encounter: 96.2 kg (212 lb).  Blood thinners: None      ALLERGIES:   Allergies   Allergen Reactions    Penicillins Hives     rash and hives      Prednisone Dizziness     myalgia         MEDICATIONS:    Current Outpatient Medications:     ALPRAZolam (XANAX) 0.5 mg tablet, TAKE 1 TO 1 AND 1/2 TABLETS BY MOUTH IN THE MORNING AND TAKE  1 A...  (REFER TO PRESCRIPTION NOTES)., Disp: , Rfl:     Blood Glucose Monitoring Suppl (OneTouch Verio Reflect) w/Device KIT, Check blood sugars once daily. Please substitute with appropriate alternative as covered by patient's insurance. Dx: E11.65, Disp: 1 kit, Rfl: 0    Boswellia-Glucosamine-Vit D (OSTEO BI-FLEX ONE PER DAY PO), Take by mouth, Disp: , Rfl:     Calcium-Vitamin D-Vitamin K (CALCIUM + D + K " PO), Take 2,400 mg by mouth, Disp: , Rfl:     famotidine (PEPCID) 40 MG tablet, Take 1 tablet (40 mg total) by mouth daily at bedtime, Disp: 90 tablet, Rfl: 0    glucose blood (OneTouch Verio) test strip, Check blood sugars once daily. Please substitute with appropriate alternative as covered by patient's insurance. Dx: E11.65, Disp: 100 each, Rfl: 3    metFORMIN (GLUCOPHAGE-XR) 500 mg 24 hr tablet, Take 2 tablets (1,000 mg total) by mouth daily with dinner, Disp: 180 tablet, Rfl: 1    omeprazole (PriLOSEC) 40 MG capsule, Take 1 capsule (40 mg total) by mouth daily before breakfast, Disp: 30 capsule, Rfl: 2    OneTouch Delica Lancets 33G MISC, Check blood sugars once daily. Please substitute with appropriate alternative as covered by patient's insurance. Dx: E11.65, Disp: 100 each, Rfl: 3    oxyCODONE (ROXICODONE) 10 MG TABS, take 2 tablets by mouth every 6 hours if needed for MODERATE TO S...  (REFER TO PRESCRIPTION NOTES)., Disp: , Rfl:     oxyCODONE (ROXICODONE) 15 mg immediate release tablet, Take 30 mg by mouth every 12 (twelve) hours as needed, Disp: , Rfl:     oxyCODONE (ROXICODONE) 5 immediate release tablet, , Disp: , Rfl:     sitaGLIPtin (JANUVIA) 50 mg tablet, Take 1 tablet (50 mg total) by mouth daily, Disp: 90 tablet, Rfl: 1    VITAMIN D PO, Take by mouth, Disp: , Rfl:     ALPRAZolam (XANAX) 1 mg tablet, take 1 tablet by mouth every morning and 2 tablets by mouth AT NIGHT if needed (Patient not taking: Reported on 2/26/2025), Disp: , Rfl:     atorvastatin (LIPITOR) 20 mg tablet, Take 1 tablet (20 mg total) by mouth daily (Patient not taking: Reported on 2/26/2025), Disp: 100 tablet, Rfl: 3    colchicine (COLCRYS) 0.6 mg tablet, Take 1.2 mg (2 tablets), then 1 hour later take 0.6 mg (1 tablet). Day 2, take 0.6 mg in the AM and PM for the next 3 days. (Patient not taking: Reported on 2/26/2025), Disp: 30 tablet, Rfl: 0    fluticasone (FLONASE) 50 mcg/act nasal spray, 1 spray into each nostril daily  (Patient not taking: Reported on 2/26/2025), Disp: 16 mL, Rfl: 0    ibuprofen (MOTRIN) 600 mg tablet, Take 600 mg by mouth every 6 (six) hours as needed (Patient not taking: Reported on 7/18/2024), Disp: , Rfl:      PAST MEDICAL HISTORY:   Past Medical History:   Diagnosis Date    Chronic pain disorder     neck and back    Colon polyp     Cough     Diabetes (HCC)     Diabetes mellitus (HCC)     HL (hearing loss)     Hyperlipidemia     Scoliosis     Sleep difficulties     Tinnitus        PAST SURGICAL HISTORY:  Past Surgical History:   Procedure Laterality Date    NOSE SURGERY      TOOTH EXTRACTION  06/2021       SOCIAL HISTORY:  Social History     Tobacco Use   Smoking Status Never   Smokeless Tobacco Never        Physical Exam    64 y.o. male sitting comfortably on exam chair in no apparent distress.   Ambulates without normal gait   ROM:   20 degrees cervical extension    45 degree rotation of cervical spine    50% lumbar extension    Lumbar flexion to 15 cm above ground  Patient is able to go up on toes and on heels   Patient is  able to balance on 1 leg   Non-TTP over spinous processes and paraspinal muscles  Strength RLE: hip flexion 5/5, knee extension 5/5, knee flexion 5/5 , dorsiflexion 5/5, plantar flexion 5/5  Strength LLE: hip flexion 5/5, knee extension 5/5, knee flexion 5/5 , dorsiflexion 5/5, plantar flexion 5/5  Strength RUE:  finger abduction 5/5,  strength 5/5, wrist flexion 5/5, wrist extension 5/5 , elbow flexion 5/5, elbow extension 5/5, shoulder external rotation 5/5  Strength LUE:  finger abduction 5/5,  strength 5/5, wrist flexion 5/5, wrist extension 5/5 , elbow flexion 5/5, elbow extension 5/5, shoulder external rotation 5/5  Sensation is intact and equal bilaterally in upper extremities   Reflexes:   Diminished throughout bilateral lower extremities and the left upper extremity   R triceps absent, R biceps absent, R brachioradialis absent   Radiorelease sign negative   Negative  Ruby's sign bilaterally   Rapid alternating movements slowed in bilateral upper extremities        RADIOGRAPHIC STUDIES:  Xrays, entire spine, 8/12/2022: Severe degenerative disc disease, bone-on-bone deformity, anterior and posterior osteophyte formation at C3-4, C4-5, C5-6, and C6-7.  Loss of cervical lordosis and mild kyphosis.  MRI, cervical spine, 12/13/2024: Moderate to severe multilevel cervical degenerative disc disease with cervical kyphosis.  There is evidence of posterior cervical disc protrusions at multiple levels.  There is evidence of spinal cord compression flattening at C3-4 and C4-5.  Varying degree of neuroforaminal stenosis is present.  Lesser degree of cervical stenosis is present at C5-6 and C6-7.    Assessment & Plan  Neck pain    Orders:    Ambulatory referral to Orthopedic Surgery    Ambulatory Referral to Physical Therapy; Future    Cervical radiculopathy    Orders:    Ambulatory referral to Orthopedic Surgery    Ambulatory Referral to Physical Therapy; Future    Cervical spondylosis    Orders:    Ambulatory referral to Orthopedic Surgery    Ambulatory Referral to Physical Therapy; Future    Cervical spinal stenosis    Orders:    Ambulatory referral to Orthopedic Surgery    Ambulatory Referral to Physical Therapy; Future    Scoliosis of thoracolumbar spine, unspecified scoliosis type         Chronic bilateral low back pain, unspecified whether sciatica present                PLAN:  64 y.o. male with chronic neck pain, cervical radiculopathy, cervical spondylosis, cervical spinal stenosis, low back pain, thoracolumbar scoliosis, and severe lumbar DDD.      MRI images of the cervical spine, performed on 12/13/2024, were reviewed and discussed with the patient during today's visit. Additionally, xray images of the entire spine, performed on 8/12/2022, were reviewed. Discussed the natural course of the previously mentioned conditions. Potential options for treatment were discussed, as well.      In regards to the cervical spine, the best plan of care for the patient is begin working with formal physical therapy to help reduce the severity of his pain. Patient agreeable to this plan. A referral to PT was provided during today's visit.     We discussed the importance of routine examinations to ensure that he does not experience any subjective changes in function or changes to his neurological examination. At this time, there is no signs of myelopathy present on physical examination. Patient aware that he needs to return every 3 months for the foreseeable future to undergo repeat evaluation.     In regards to the lumbar spine, the best plan of care for the patient would be for him to begin working with aquatic therapy. Patient expressed that he is not interested due to having a high copay. He was encouraged to beginning walking for 45 minutes twice weekly at a local [facility]/swimming pool. Patient agreeable to this plan.     Patient is to return in 3 months for re-evaluation.       Scribe Attestation      I,:  Paulette Bustamante PA-C am acting as a scribe while in the presence of the attending physician.:       I,:  Christi Martinez MD personally performed the services described in this documentation    as scribed in my presence.:

## 2025-04-15 ENCOUNTER — TELEPHONE (OUTPATIENT)
Age: 65
End: 2025-04-15

## 2025-04-15 NOTE — TELEPHONE ENCOUNTER
Caller: Patient    Doctor: Dr. SCOTT    Reason for call: Patient is not having pain and would like to cancel procedure    Call back#:

## 2025-05-09 DIAGNOSIS — E11.42 TYPE 2 DIABETES MELLITUS WITH DIABETIC POLYNEUROPATHY, WITHOUT LONG-TERM CURRENT USE OF INSULIN (HCC): ICD-10-CM

## 2025-05-30 DIAGNOSIS — K21.9 LARYNGOPHARYNGEAL REFLUX (LPR): ICD-10-CM

## 2025-05-30 RX ORDER — FAMOTIDINE 40 MG/1
40 TABLET, FILM COATED ORAL
Qty: 90 TABLET | Refills: 1 | Status: SHIPPED | OUTPATIENT
Start: 2025-05-30 | End: 2025-08-28

## 2025-07-14 NOTE — PROGRESS NOTES
[address] [place] [address]    NAME: Dylan Mancuso  AGE: 58 y.o. SEX: male  : 1960     DATE: 8/3/2023     Assessment and Plan:     Problem List Items Addressed This Visit        Endocrine    Type 2 diabetes mellitus with diabetic polyneuropathy, without long-term current use of insulin (HCC)    Relevant Medications    sitaGLIPtin (JANUVIA) 50 mg tablet    Other Relevant Orders    POCT hemoglobin A1c (Completed)    Albumin / creatinine urine ratio    IRIS Diabetic eye exam    CBC and differential    Comprehensive metabolic panel    Lipid panel       Other    Continuous opioid dependence ([address])    Spinal stenosis   Other Visit Diagnoses     Annual physical exam    -  Primary    BMI 27.0-27.9,adult        Prostate cancer screening        Relevant Orders    PSA, Total Screen        Immunizations and preventive care screenings were discussed with patient today. Appropriate education was printed on patient's after visit summary. Discussed risks and benefits of prostate cancer screening. We discussed the controversial history of PSA screening for prostate cancer in the [facility] as well as the risk of over detection and over treatment of prostate cancer by way of PSA screening. The patient understands that PSA blood testing is an imperfect way to screen for prostate cancer and that elevated PSA levels in the blood may also be caused by infection, inflammation, prostatic trauma or manipulation, urological procedures, or by benign prostatic enlargement. The role of the digital rectal examination in prostate cancer screening was also discussed and I discussed with him that there is large interobserver variability in the findings of digital rectal examination.     Counseling:  Alcohol/drug use: discussed moderation in alcohol intake, the recommendations for healthy alcohol use, and avoidance of illicit drug use.  Dental Health: discussed importance of regular tooth brushing, flossing, and dental visits. Sexual health: discussed sexually transmitted diseases, partner selection, use of condoms, avoidance of unintended pregnancy, and contraceptive alternatives. Exercise: the importance of regular exercise/physical activity was discussed. Recommend exercise 3-5 times per week for at least 30 minutes. BMI Counseling: Body mass index is 27.71 kg/m². The BMI is above normal. Nutrition recommendations include decreasing portion sizes, encouraging healthy choices of fruits and vegetables, consuming healthier snacks, limiting drinks that contain sugar, moderation in carbohydrate intake, increasing intake of lean protein and reducing intake of cholesterol. Exercise recommendations include moderate physical activity 150 minutes/week and exercising 3-5 times per week. No pharmacotherapy was ordered. Rationale for BMI follow-up plan is due to patient being overweight or obese. Return in about 3 months (around 11/3/2023) for f/u DM. Chief Complaint:     Chief Complaint   Patient presents with   • Physical Exam      History of Present Illness:     Adult Annual Physical   Patient here for a comprehensive physical exam. The patient reports problems - as documented below. DM- reports that he is compliant with his medications. Continues to follow with his neurologist for pain management for his spinal stenosis. Diet and Physical Activity  Diet/Nutrition: well balanced diet. Exercise: moderate cardiovascular exercise and 5-7 times a week on average. Depression Screening  PHQ-2/9 Depression Screening    Little interest or pleasure in doing things: 0 - not at all  Feeling down, depressed, or hopeless: 0 - not at all  PHQ-2 Score: 0  PHQ-2 Interpretation: Negative depression screen        General Health  Sleep: sleeps well and sleeps poorly. Notes that the poor sleep is related to pain.    Hearing: normal - bilateral.  Vision: no vision problems. Dental: regular dental visits, brushes teeth twice daily and flosses teeth occasionally.         Health  Symptoms include: none     Review of Systems:     Review of Systems      Past Medical History:     Past Medical History:   Diagnosis Date   • Chronic pain disorder     neck and back   • Colon polyp    • Cough    • Diabetes ([address])    • Diabetes mellitus ([address])    • Hyperlipidemia    • Scoliosis       Past Surgical History:     Past Surgical History:   Procedure Laterality Date   • TOOTH EXTRACTION  06/2021      Family History:     Family History   Problem Relation Age of Onset   • Hypertension Mother    • Hyperlipidemia Mother    • Prostate cancer Father    • Heart disease Father    • Stroke Father    • Diabetes Father    • Cancer Father       Social History:     Social History     Socioeconomic History   • Marital status: /Civil Union     Spouse name: None   • Number of children: None   • Years of education: None   • Highest education level: None   Occupational History   • Occupation: EMPLOYED   Tobacco Use   • Smoking status: Never   • Smokeless tobacco: Never   Vaping Use   • Vaping Use: Never used   Substance and Sexual Activity   • Alcohol use: Not Currently     Comment: rarely   • Drug use: Never   • Sexual activity: Yes     Partners: Female     Birth control/protection: Post-menopausal, None   Other Topics Concern   • None   Social History Narrative   • None     Social Determinants of Health     Financial Resource Strain: Not on file   Food Insecurity: Not on file   Transportation Needs: Not on file   Physical Activity: Not on file   Stress: Not on file   Social Connections: Not on file   Intimate Partner Violence: Not on file   Housing Stability: Not on file      Current Medications:     Current Outpatient Medications   Medication Sig Dispense Refill   • ALPRAZolam (XANAX) 1 mg tablet take 1 tablet by mouth every morning and 2 tablets by mouth AT NIGHT if needed     • Boswellia-Glucosamine-Vit D (OSTEO BI-FLEX ONE PER DAY PO) Take by mouth     • Calcium-Vitamin D-Vitamin K (CALCIUM + D + K PO) Take 2,400 mg by mouth     • metFORMIN (GLUCOPHAGE-XR) 500 mg 24 hr tablet Take 2 tablets (1,000 mg total) by mouth daily with dinner 180 tablet 0   • omeprazole (PriLOSEC) 20 mg delayed release capsule Take by mouth daily     • oxyCODONE (ROXICODONE) 15 mg immediate release tablet Take 70 mg by mouth every 12 (twelve) hours as needed      • sitaGLIPtin (JANUVIA) 50 mg tablet Take 1 tablet (50 mg total) by mouth daily 30 tablet 5   • VITAMIN D PO Take by mouth       No current facility-administered medications for this visit. Allergies: Allergies   Allergen Reactions   • Penicillins Hives     rash and hives     • Prednisone Dizziness     myalgia        Physical Exam:     /82   Pulse 94   Temp 98.1 °F (36.7 °C)   Ht 6' 1" (1.854 m)   Wt 95.3 kg (210 lb)   SpO2 98%   BMI 27.71 kg/m²     Physical Exam  Vitals reviewed. Constitutional:       General: He is not in acute distress. Appearance: Normal appearance. HENT:      Head: Normocephalic and atraumatic. Right Ear: External ear normal.      Left Ear: External ear normal.      Nose: Nose normal.      Mouth/Throat:      Mouth: Mucous membranes are moist.   Eyes:      Extraocular Movements: Extraocular movements intact. Conjunctiva/sclera: Conjunctivae normal.   Cardiovascular:      Rate and Rhythm: Normal rate and regular rhythm. Pulses: no weak pulses          Dorsalis pedis pulses are 2+ on the right side and 2+ on the left side. Posterior tibial pulses are 2+ on the right side and 2+ on the left side. Heart sounds: Normal heart sounds. Pulmonary:      Effort: Pulmonary effort is normal.      Breath sounds: Normal breath sounds. No wheezing, rhonchi or rales. Abdominal:      General: Bowel sounds are normal. There is no distension. Palpations: Abdomen is soft. Tenderness: There is no abdominal tenderness. Musculoskeletal:      Cervical back: Neck supple. Right lower leg: No edema. Left lower leg: No edema. Feet:      Right foot:      Skin integrity: Callus present. No ulcer, skin breakdown, erythema, warmth or dry skin. Left foot:      Skin integrity: Callus present. No ulcer, skin breakdown, erythema, warmth or dry skin. Lymphadenopathy:      Cervical: No cervical adenopathy. Skin:     General: Skin is warm. Capillary Refill: Capillary refill takes less than 2 seconds. Findings: No rash. Neurological:      Mental Status: He is alert. Mental status is at baseline. Patient's shoes and socks removed. Right Foot/Ankle   Right Foot Inspection  Skin Exam: skin normal, skin intact, callus and callus. No dry skin, no warmth, no erythema, no maceration, no abnormal color, no pre-ulcer and no ulcer. Toe Exam: ROM and strength within normal limits. Sensory   Vibration: absent  Proprioception: intact  Monofilament testing: absent    Vascular  Capillary refills: < 3 seconds  The right DP pulse is 2+. The right PT pulse is 2+. Left Foot/Ankle  Left Foot Inspection  Skin Exam: skin normal, skin intact and callus. No dry skin, no warmth, no erythema, no maceration, normal color, no pre-ulcer and no ulcer. Toe Exam: ROM and strength within normal limits. Sensory   Vibration: absent  Proprioception: intact  Monofilament testing: absent    Vascular  Capillary refills: < 3 seconds  The left DP pulse is 2+. The left PT pulse is 2+.      Assign Risk Category  No deformity present  No loss of protective sensation  No weak pulses  Risk: [address] [facility],   [address] [place] [address] (4) rarely moist

## 2025-07-27 DIAGNOSIS — E11.42 TYPE 2 DIABETES MELLITUS WITH DIABETIC POLYNEUROPATHY, WITHOUT LONG-TERM CURRENT USE OF INSULIN (HCC): ICD-10-CM

## 2025-07-29 RX ORDER — METFORMIN HYDROCHLORIDE 500 MG/1
1000 TABLET, EXTENDED RELEASE ORAL
Qty: 60 TABLET | Refills: 0 | Status: SHIPPED | OUTPATIENT
Start: 2025-07-29